# Patient Record
Sex: FEMALE | Race: WHITE | NOT HISPANIC OR LATINO | URBAN - METROPOLITAN AREA
[De-identification: names, ages, dates, MRNs, and addresses within clinical notes are randomized per-mention and may not be internally consistent; named-entity substitution may affect disease eponyms.]

---

## 2024-03-11 ENCOUNTER — APPOINTMENT (OUTPATIENT)
Dept: PRIMARY CARE | Facility: CLINIC | Age: 49
End: 2024-03-11
Payer: COMMERCIAL

## 2024-03-25 ENCOUNTER — OFFICE VISIT (OUTPATIENT)
Dept: PRIMARY CARE | Facility: CLINIC | Age: 49
End: 2024-03-25
Payer: COMMERCIAL

## 2024-03-25 ENCOUNTER — LAB (OUTPATIENT)
Dept: LAB | Facility: LAB | Age: 49
End: 2024-03-25
Payer: COMMERCIAL

## 2024-03-25 VITALS
OXYGEN SATURATION: 98 % | DIASTOLIC BLOOD PRESSURE: 81 MMHG | WEIGHT: 182 LBS | HEIGHT: 67 IN | SYSTOLIC BLOOD PRESSURE: 129 MMHG | HEART RATE: 62 BPM | BODY MASS INDEX: 28.56 KG/M2

## 2024-03-25 DIAGNOSIS — Z87.440 HISTORY OF UTI: ICD-10-CM

## 2024-03-25 DIAGNOSIS — E55.9 VITAMIN D DEFICIENCY: ICD-10-CM

## 2024-03-25 DIAGNOSIS — Z00.00 WELLNESS EXAMINATION: ICD-10-CM

## 2024-03-25 DIAGNOSIS — Z13.220 LIPID SCREENING: ICD-10-CM

## 2024-03-25 DIAGNOSIS — Z12.31 ENCOUNTER FOR SCREENING MAMMOGRAM FOR MALIGNANT NEOPLASM OF BREAST: ICD-10-CM

## 2024-03-25 DIAGNOSIS — Z12.11 COLON CANCER SCREENING: ICD-10-CM

## 2024-03-25 LAB
ALBUMIN SERPL BCP-MCNC: 3.8 G/DL (ref 3.4–5)
ALP SERPL-CCNC: 45 U/L (ref 33–110)
ALT SERPL W P-5'-P-CCNC: 7 U/L (ref 7–45)
ANION GAP SERPL CALC-SCNC: 10 MMOL/L (ref 10–20)
AST SERPL W P-5'-P-CCNC: 11 U/L (ref 9–39)
BASOPHILS # BLD AUTO: 0.04 X10*3/UL (ref 0–0.1)
BASOPHILS NFR BLD AUTO: 0.6 %
BILIRUB SERPL-MCNC: 0.7 MG/DL (ref 0–1.2)
BUN SERPL-MCNC: 10 MG/DL (ref 6–23)
CALCIUM SERPL-MCNC: 8.7 MG/DL (ref 8.6–10.3)
CHLORIDE SERPL-SCNC: 109 MMOL/L (ref 98–107)
CHOLEST SERPL-MCNC: 187 MG/DL (ref 0–199)
CHOLESTEROL/HDL RATIO: 3.3
CO2 SERPL-SCNC: 26 MMOL/L (ref 21–32)
CREAT SERPL-MCNC: 0.7 MG/DL (ref 0.5–1.05)
EGFRCR SERPLBLD CKD-EPI 2021: >90 ML/MIN/1.73M*2
EOSINOPHIL # BLD AUTO: 0.15 X10*3/UL (ref 0–0.7)
EOSINOPHIL NFR BLD AUTO: 2.3 %
ERYTHROCYTE [DISTWIDTH] IN BLOOD BY AUTOMATED COUNT: 14.6 % (ref 11.5–14.5)
GLUCOSE SERPL-MCNC: 91 MG/DL (ref 74–99)
HCT VFR BLD AUTO: 41.5 % (ref 36–46)
HDLC SERPL-MCNC: 56.2 MG/DL
HGB BLD-MCNC: 12.5 G/DL (ref 12–16)
IMM GRANULOCYTES # BLD AUTO: 0.01 X10*3/UL (ref 0–0.7)
IMM GRANULOCYTES NFR BLD AUTO: 0.2 % (ref 0–0.9)
LDLC SERPL CALC-MCNC: 115 MG/DL
LYMPHOCYTES # BLD AUTO: 2.83 X10*3/UL (ref 1.2–4.8)
LYMPHOCYTES NFR BLD AUTO: 42.6 %
MCH RBC QN AUTO: 28.2 PG (ref 26–34)
MCHC RBC AUTO-ENTMCNC: 30.1 G/DL (ref 32–36)
MCV RBC AUTO: 94 FL (ref 80–100)
MONOCYTES # BLD AUTO: 0.51 X10*3/UL (ref 0.1–1)
MONOCYTES NFR BLD AUTO: 7.7 %
NEUTROPHILS # BLD AUTO: 3.1 X10*3/UL (ref 1.2–7.7)
NEUTROPHILS NFR BLD AUTO: 46.6 %
NON HDL CHOLESTEROL: 131 MG/DL (ref 0–149)
NRBC BLD-RTO: 0 /100 WBCS (ref 0–0)
PLATELET # BLD AUTO: 298 X10*3/UL (ref 150–450)
POC APPEARANCE, URINE: CLEAR
POC BILIRUBIN, URINE: NEGATIVE
POC BLOOD, URINE: ABNORMAL
POC COLOR, URINE: YELLOW
POC GLUCOSE, URINE: NEGATIVE MG/DL
POC KETONES, URINE: NEGATIVE MG/DL
POC LEUKOCYTES, URINE: NEGATIVE
POC NITRITE,URINE: NEGATIVE
POC PH, URINE: 7 PH
POC PROTEIN, URINE: ABNORMAL MG/DL
POC SPECIFIC GRAVITY, URINE: 1.02
POC UROBILINOGEN, URINE: 0.2 EU/DL
POTASSIUM SERPL-SCNC: 4.1 MMOL/L (ref 3.5–5.3)
PROT SERPL-MCNC: 6.3 G/DL (ref 6.4–8.2)
RBC # BLD AUTO: 4.43 X10*6/UL (ref 4–5.2)
SODIUM SERPL-SCNC: 141 MMOL/L (ref 136–145)
TRIGL SERPL-MCNC: 78 MG/DL (ref 0–149)
VLDL: 16 MG/DL (ref 0–40)
WBC # BLD AUTO: 6.6 X10*3/UL (ref 4.4–11.3)

## 2024-03-25 PROCEDURE — 81003 URINALYSIS AUTO W/O SCOPE: CPT | Performed by: NURSE PRACTITIONER

## 2024-03-25 PROCEDURE — 1036F TOBACCO NON-USER: CPT | Performed by: NURSE PRACTITIONER

## 2024-03-25 PROCEDURE — 80061 LIPID PANEL: CPT

## 2024-03-25 PROCEDURE — 82306 VITAMIN D 25 HYDROXY: CPT

## 2024-03-25 PROCEDURE — 85025 COMPLETE CBC W/AUTO DIFF WBC: CPT

## 2024-03-25 PROCEDURE — 99386 PREV VISIT NEW AGE 40-64: CPT | Performed by: NURSE PRACTITIONER

## 2024-03-25 PROCEDURE — 80053 COMPREHEN METABOLIC PANEL: CPT

## 2024-03-25 PROCEDURE — 36415 COLL VENOUS BLD VENIPUNCTURE: CPT

## 2024-03-25 RX ORDER — NORETHINDRONE ACETATE AND ETHINYL ESTRADIOL 1; 5 MG/1; UG/1
TABLET ORAL EVERY 24 HOURS
COMMUNITY
Start: 2021-11-29 | End: 2024-04-22 | Stop reason: SDUPTHER

## 2024-03-25 ASSESSMENT — ENCOUNTER SYMPTOMS
OCCASIONAL FEELINGS OF UNSTEADINESS: 1
DEPRESSION: 0
LOSS OF SENSATION IN FEET: 0

## 2024-03-25 NOTE — PROGRESS NOTES
"Subjective   Patient ID: Ange Mcdonald is a 48 y.o. female who presents for New Patient Visit (Patient is here today to establish care with a new pcp. Patient has complaints of left foot pain and the skin on her left legs is also painful. ).    48-year-old female here to establish care has not had a pcp. Has only seen urgent cares and GYN.  Has not had a general CPE.  No labs.   Fasting this am  3 UTI since Jan 2024.  Each time she went to the urgent care they gave her a different antibiotic.  She never had hematuria.  She developed a rash on her legs with pain and burning was treated with prednisone with resolution did not know what the rash was from states she thought it was from urinary tract infection.  Med HX: UTI; occasional hand numbness upon waking; chronic feet pain- stand all day at work- wear good shoes; chronic knee pain only when up and down-cannot squat without pain  Colon cancer screening-overdue  Breast cancer screening-mammogram 2/2023    Vision-has never had a vision check and denies any issues  Dentist- has not gone. Jaw surgery in teens.  Could not tell me the diagnosis but said that they broke her jaw and reset it and she had her jaw wired shut for 6 weeks.  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  But she is on her feet all day long does not sit she does kneel a lot    Takes Vitamin D supplement daily with Biotin, B complex, osteo-biflex  Only RX- hormones from GYN.          Review of Systems    Objective   /81   Pulse 62   Ht 1.702 m (5' 7\")   Wt 82.6 kg (182 lb)   SpO2 98%   BMI 28.51 kg/m²     Physical Exam  Vitals and nursing note reviewed.   Constitutional:       General: She is not in acute distress.     Appearance: Normal appearance. She is normal weight.   HENT:      Head: Normocephalic and atraumatic.      Right Ear: External ear normal.      Left Ear: External ear normal.      Nose: Nose normal.      Mouth/Throat: "      Mouth: Mucous membranes are moist.      Pharynx: Oropharynx is clear.   Eyes:      Extraocular Movements: Extraocular movements intact.      Conjunctiva/sclera: Conjunctivae normal.      Pupils: Pupils are equal, round, and reactive to light.   Neck:      Vascular: No carotid bruit.   Cardiovascular:      Rate and Rhythm: Normal rate and regular rhythm.      Pulses: Normal pulses.      Heart sounds: Normal heart sounds.   Pulmonary:      Effort: Pulmonary effort is normal.      Breath sounds: Normal breath sounds.   Musculoskeletal:      Cervical back: Normal range of motion and neck supple.   Lymphadenopathy:      Cervical: No cervical adenopathy.   Skin:     General: Skin is warm and dry.      Capillary Refill: Capillary refill takes less than 2 seconds.   Neurological:      Mental Status: She is alert and oriented to person, place, and time.   Psychiatric:         Mood and Affect: Mood normal.         Behavior: Behavior normal.         Thought Content: Thought content normal.         Judgment: Judgment normal.         Assessment/Plan   Diagnoses and all orders for this visit:  Encounter for screening mammogram for malignant neoplasm of breast  Comments:  She wants her GYN to order the mammogram  Colon cancer screening  Comments:  Agreeable for the Cologuard  Orders:  -     Cologuard® colon cancer screening; Future  Lipid screening  Comments:  Update fasting lipid panel  Orders:  -     Lipid Panel; Future  History of UTI  Comments:  Update in office urinalysis while asymptomatic so we have a baseline  Orders:  -     POCT UA Automated manually resulted  Vitamin D deficiency  Comments:  On vitamin D supplement.  Add vitamin D level goal 50-80  Orders:  -     Vitamin D 25-Hydroxy,Total (for eval of Vitamin D levels); Future  Wellness examination  Comments:  Update CBC CMP.  Screening discussed with patient.  Continue routine.  GYN visits.  Follow-up q. year and as needed  Orders:  -     CBC and Auto  Differential; Future  -     Comprehensive Metabolic Panel; Future  -     Lipid Panel; Future  Other orders  -     Follow Up In Primary Care - Health Maintenance; Future

## 2024-03-26 LAB — 25(OH)D3 SERPL-MCNC: 80 NG/ML (ref 30–100)

## 2024-04-14 LAB — NONINV COLON CA DNA+OCC BLD SCRN STL QL: POSITIVE

## 2024-04-22 ENCOUNTER — OFFICE VISIT (OUTPATIENT)
Dept: OBSTETRICS AND GYNECOLOGY | Facility: CLINIC | Age: 49
End: 2024-04-22
Payer: COMMERCIAL

## 2024-04-22 VITALS
HEIGHT: 68 IN | DIASTOLIC BLOOD PRESSURE: 80 MMHG | SYSTOLIC BLOOD PRESSURE: 101 MMHG | BODY MASS INDEX: 27.9 KG/M2 | WEIGHT: 184.1 LBS

## 2024-04-22 DIAGNOSIS — Z12.31 SCREENING MAMMOGRAM FOR BREAST CANCER: ICD-10-CM

## 2024-04-22 DIAGNOSIS — N95.1 SYMPTOMATIC MENOPAUSAL OR FEMALE CLIMACTERIC STATES: ICD-10-CM

## 2024-04-22 DIAGNOSIS — Z01.419 WELL WOMAN EXAM WITH ROUTINE GYNECOLOGICAL EXAM: Primary | ICD-10-CM

## 2024-04-22 PROCEDURE — 99396 PREV VISIT EST AGE 40-64: CPT | Performed by: OBSTETRICS & GYNECOLOGY

## 2024-04-22 PROCEDURE — 99386 PREV VISIT NEW AGE 40-64: CPT | Performed by: OBSTETRICS & GYNECOLOGY

## 2024-04-22 PROCEDURE — 1036F TOBACCO NON-USER: CPT | Performed by: OBSTETRICS & GYNECOLOGY

## 2024-04-22 RX ORDER — NORETHINDRONE ACETATE AND ETHINYL ESTRADIOL 1; 5 MG/1; UG/1
1 TABLET ORAL EVERY 24 HOURS
Qty: 90 TABLET | Refills: 3 | Status: SHIPPED | OUTPATIENT
Start: 2024-04-22 | End: 2025-04-22

## 2024-04-22 ASSESSMENT — PATIENT HEALTH QUESTIONNAIRE - PHQ9
2. FEELING DOWN, DEPRESSED OR HOPELESS: NOT AT ALL
SUM OF ALL RESPONSES TO PHQ9 QUESTIONS 1 AND 2: 0
1. LITTLE INTEREST OR PLEASURE IN DOING THINGS: NOT AT ALL

## 2024-04-22 ASSESSMENT — ENCOUNTER SYMPTOMS
DEPRESSION: 0
OCCASIONAL FEELINGS OF UNSTEADINESS: 0
LOSS OF SENSATION IN FEET: 0

## 2024-04-22 ASSESSMENT — PAIN SCALES - GENERAL: PAINLEVEL: 0-NO PAIN

## 2024-04-22 NOTE — PROGRESS NOTES
Stormy Mcdonald is a 49 y.o.  female who presents for annual exam. The patient has no complaints today. The patient is sexually active. GYN screening history: last pap: was normal. The patient is taking hormone replacement therapy. Patient denies post-menopausal vaginal bleeding.. The patient wears seatbelts: yes. The patient participates in regular exercise: no. Has the patient ever been transfused or tattooed?: not asked. The patient reports that there is not domestic violence in their life.  Pt would like to contnue with fem hrt.     Menstrual History:  OB History          0    Para   0    Term   0       0    AB   0    Living   0         SAB   0    IAB   0    Ectopic   0    Multiple   0    Live Births   0                  No LMP recorded. Patient is premenopausal.         History reviewed. No pertinent past medical history.    History reviewed. No pertinent surgical history.     No Known Allergies      Family History   Problem Relation Name Age of Onset    Diabetes type II Father      Ovarian cancer Father's Sister  50 - 59    Breast cancer Paternal Grandmother  40 - 49        Social History     Socioeconomic History    Marital status: Single     Spouse name: Not on file    Number of children: Not on file    Years of education: Not on file    Highest education level: Not on file   Occupational History    Not on file   Tobacco Use    Smoking status: Never    Smokeless tobacco: Never   Vaping Use    Vaping status: Never Used   Substance and Sexual Activity    Alcohol use: Yes     Comment: Socially    Drug use: Never    Sexual activity: Yes     Partners: Male   Other Topics Concern    Not on file   Social History Narrative    Not on file     Social Determinants of Health     Financial Resource Strain: Not on file   Food Insecurity: Not on file   Transportation Needs: Not on file   Physical Activity: Not on file   Stress: Not on file   Social Connections: Not on file   Intimate  "Partner Violence: Not on file   Housing Stability: Not on file            Objective   /80   Ht 1.735 m (5' 8.31\") Comment: WITH SHOES  Wt 83.5 kg (184 lb 1.6 oz) Comment: WITH SHOES  BMI 27.74 kg/m²     Physical Exam  Vitals and nursing note reviewed.   Constitutional:       General: She is not in acute distress.     Appearance: Normal appearance. She is not ill-appearing.   HENT:      Head: Normocephalic and atraumatic.      Mouth/Throat:      Mouth: Mucous membranes are moist.      Pharynx: Oropharynx is clear.   Eyes:      Extraocular Movements: Extraocular movements intact.      Conjunctiva/sclera: Conjunctivae normal.      Pupils: Pupils are equal, round, and reactive to light.   Neck:      Thyroid: No thyroid mass, thyromegaly or thyroid tenderness.   Cardiovascular:      Rate and Rhythm: Normal rate and regular rhythm.      Pulses: Normal pulses.      Heart sounds: Normal heart sounds. No murmur heard.  Pulmonary:      Effort: Pulmonary effort is normal.      Breath sounds: No wheezing or rhonchi.   Chest:   Breasts:     Right: Normal. No swelling, bleeding, inverted nipple, mass, nipple discharge, skin change or tenderness.      Left: Normal. No swelling, bleeding, inverted nipple, mass, nipple discharge, skin change or tenderness.   Abdominal:      General: Bowel sounds are normal. There is no distension.      Palpations: There is no mass.      Tenderness: There is no abdominal tenderness. There is no guarding or rebound.      Hernia: No hernia is present. There is no hernia in the left inguinal area or right inguinal area.   Genitourinary:     General: Normal vulva.      Pubic Area: No rash.       Labia:         Right: No rash, tenderness, lesion or injury.         Left: No rash, tenderness, lesion or injury.       Urethra: No prolapse or urethral swelling.      Vagina: No signs of injury. No vaginal discharge, tenderness or prolapsed vaginal walls.      Cervix: No cervical motion tenderness, " discharge, friability, lesion, erythema or cervical bleeding.      Uterus: Not deviated, not enlarged, not fixed, not tender and no uterine prolapse.       Adnexa:         Right: No mass, tenderness or fullness.          Left: No mass, tenderness or fullness.     Musculoskeletal:         General: No swelling, tenderness, deformity or signs of injury. Normal range of motion.      Cervical back: No rigidity.   Lymphadenopathy:      Cervical: No cervical adenopathy.      Upper Body:      Right upper body: No axillary adenopathy.      Left upper body: No axillary adenopathy.      Lower Body: No right inguinal adenopathy. No left inguinal adenopathy.   Skin:     General: Skin is warm.      Findings: No lesion or rash.   Neurological:      General: No focal deficit present.      Mental Status: She is alert and oriented to person, place, and time.   Psychiatric:         Mood and Affect: Mood normal.         Behavior: Behavior normal.         Thought Content: Thought content normal.         Judgment: Judgment normal.            Assessment/Plan   Problem List Items Addressed This Visit    None  Visit Diagnoses       Well woman exam with routine gynecological exam    -  Primary    Symptomatic menopausal or female climacteric states        Relevant Medications    norethindrone ac-eth estradioL (Femhrt 1/5) 1-5 mg-mcg tablet    Screening mammogram for breast cancer        Relevant Orders    BI mammo bilateral screening tomosynthesis           Follow up in about 1 year (around 4/22/2025) for well woman exam.      All questions answered.  Breast self exam technique reviewed and patient encouraged to perform self-exam monthly.  Diagnosis explained in detail, including differential.  Discussed healthy lifestyle modifications.

## 2024-04-23 DIAGNOSIS — R19.5 POSITIVE COLORECTAL CANCER SCREENING USING COLOGUARD TEST: Primary | ICD-10-CM

## 2024-04-29 DIAGNOSIS — Z12.11 SPECIAL SCREENING FOR MALIGNANT NEOPLASMS, COLON: ICD-10-CM

## 2024-04-29 RX ORDER — POLYETHYLENE GLYCOL 3350, SODIUM SULFATE ANHYDROUS, SODIUM BICARBONATE, SODIUM CHLORIDE, POTASSIUM CHLORIDE 236; 22.74; 6.74; 5.86; 2.97 G/4L; G/4L; G/4L; G/4L; G/4L
POWDER, FOR SOLUTION ORAL
Qty: 4000 ML | Refills: 0 | Status: SHIPPED | OUTPATIENT
Start: 2024-04-29

## 2024-06-17 ENCOUNTER — HOSPITAL ENCOUNTER (OUTPATIENT)
Dept: RADIOLOGY | Facility: HOSPITAL | Age: 49
Discharge: HOME | End: 2024-06-17
Payer: COMMERCIAL

## 2024-06-17 VITALS — HEIGHT: 68 IN | WEIGHT: 182 LBS | BODY MASS INDEX: 27.58 KG/M2

## 2024-06-17 DIAGNOSIS — Z12.31 SCREENING MAMMOGRAM FOR BREAST CANCER: ICD-10-CM

## 2024-06-17 PROCEDURE — 77067 SCR MAMMO BI INCL CAD: CPT

## 2024-06-17 PROCEDURE — 77067 SCR MAMMO BI INCL CAD: CPT | Performed by: RADIOLOGY

## 2024-06-17 PROCEDURE — 77063 BREAST TOMOSYNTHESIS BI: CPT | Performed by: RADIOLOGY

## 2024-07-15 ENCOUNTER — APPOINTMENT (OUTPATIENT)
Dept: GASTROENTEROLOGY | Facility: EXTERNAL LOCATION | Age: 49
End: 2024-07-15
Payer: COMMERCIAL

## 2024-07-15 DIAGNOSIS — D12.8 BENIGN NEOPLASM OF RECTUM AND ANAL CANAL: ICD-10-CM

## 2024-07-15 DIAGNOSIS — R19.5 POSITIVE COLORECTAL CANCER SCREENING USING COLOGUARD TEST: Primary | ICD-10-CM

## 2024-07-15 DIAGNOSIS — D12.9 BENIGN NEOPLASM OF RECTUM AND ANAL CANAL: ICD-10-CM

## 2024-07-15 DIAGNOSIS — D12.3 BENIGN NEOPLASM OF TRANSVERSE COLON: ICD-10-CM

## 2024-07-15 DIAGNOSIS — R19.5 POSITIVE COLORECTAL CANCER SCREENING USING COLOGUARD TEST: ICD-10-CM

## 2024-07-15 DIAGNOSIS — D12.2 BENIGN NEOPLASM OF ASCENDING COLON: ICD-10-CM

## 2024-07-15 PROCEDURE — 88305 TISSUE EXAM BY PATHOLOGIST: CPT | Performed by: PATHOLOGY

## 2024-07-15 PROCEDURE — 88305 TISSUE EXAM BY PATHOLOGIST: CPT

## 2024-07-15 PROCEDURE — 45385 COLONOSCOPY W/LESION REMOVAL: CPT | Performed by: INTERNAL MEDICINE

## 2024-07-15 NOTE — PROGRESS NOTES
Colonoscopy performed today 7/15/2024 at [place] [facility] [place] [place]  See procedure report(s) under Media tab.

## 2024-07-16 ENCOUNTER — LAB REQUISITION (OUTPATIENT)
Dept: LAB | Facility: HOSPITAL | Age: 49
End: 2024-07-16
Payer: COMMERCIAL

## 2024-07-24 LAB
LABORATORY COMMENT REPORT: NORMAL
PATH REPORT.FINAL DX SPEC: NORMAL
PATH REPORT.GROSS SPEC: NORMAL
PATH REPORT.RELEVANT HX SPEC: NORMAL
PATH REPORT.TOTAL CANCER: NORMAL

## 2024-09-07 ENCOUNTER — LAB REQUISITION (OUTPATIENT)
Dept: LAB | Facility: HOSPITAL | Age: 49
End: 2024-09-07
Payer: COMMERCIAL

## 2024-09-07 DIAGNOSIS — N39.0 URINARY TRACT INFECTION, SITE NOT SPECIFIED: ICD-10-CM

## 2024-09-07 PROCEDURE — 87086 URINE CULTURE/COLONY COUNT: CPT

## 2024-09-07 PROCEDURE — 87186 SC STD MICRODIL/AGAR DIL: CPT

## 2024-09-10 LAB — BACTERIA UR CULT: ABNORMAL

## 2024-11-25 ENCOUNTER — LAB (OUTPATIENT)
Dept: LAB | Facility: LAB | Age: 49
End: 2024-11-25
Payer: COMMERCIAL

## 2024-11-25 ENCOUNTER — APPOINTMENT (OUTPATIENT)
Dept: PRIMARY CARE | Facility: CLINIC | Age: 49
End: 2024-11-25
Payer: COMMERCIAL

## 2024-11-25 VITALS
BODY MASS INDEX: 28.64 KG/M2 | SYSTOLIC BLOOD PRESSURE: 127 MMHG | HEART RATE: 67 BPM | WEIGHT: 189 LBS | OXYGEN SATURATION: 99 % | DIASTOLIC BLOOD PRESSURE: 79 MMHG | HEIGHT: 68 IN

## 2024-11-25 DIAGNOSIS — R10.9 FLANK PAIN: Primary | ICD-10-CM

## 2024-11-25 DIAGNOSIS — R11.11 VOMITING WITHOUT NAUSEA, UNSPECIFIED VOMITING TYPE: ICD-10-CM

## 2024-11-25 DIAGNOSIS — R10.9 FLANK PAIN: ICD-10-CM

## 2024-11-25 LAB
ALBUMIN SERPL BCP-MCNC: 4.1 G/DL (ref 3.4–5)
ALP SERPL-CCNC: 57 U/L (ref 33–110)
ALT SERPL W P-5'-P-CCNC: 7 U/L (ref 7–45)
AMYLASE SERPL-CCNC: 107 U/L (ref 29–103)
ANION GAP SERPL CALC-SCNC: 13 MMOL/L (ref 10–20)
APPEARANCE UR: CLEAR
AST SERPL W P-5'-P-CCNC: 11 U/L (ref 9–39)
BASOPHILS # BLD AUTO: 0.04 X10*3/UL (ref 0–0.1)
BASOPHILS NFR BLD AUTO: 0.5 %
BILIRUB SERPL-MCNC: 0.4 MG/DL (ref 0–1.2)
BILIRUB UR STRIP.AUTO-MCNC: NEGATIVE MG/DL
BUN SERPL-MCNC: 14 MG/DL (ref 6–23)
CALCIUM SERPL-MCNC: 9 MG/DL (ref 8.6–10.3)
CAOX CRY #/AREA UR COMP ASSIST: ABNORMAL /HPF
CHLORIDE SERPL-SCNC: 104 MMOL/L (ref 98–107)
CO2 SERPL-SCNC: 26 MMOL/L (ref 21–32)
COLOR UR: ABNORMAL
CREAT SERPL-MCNC: 0.73 MG/DL (ref 0.5–1.05)
EGFRCR SERPLBLD CKD-EPI 2021: >90 ML/MIN/1.73M*2
EOSINOPHIL # BLD AUTO: 0.07 X10*3/UL (ref 0–0.7)
EOSINOPHIL NFR BLD AUTO: 0.8 %
ERYTHROCYTE [DISTWIDTH] IN BLOOD BY AUTOMATED COUNT: 13.1 % (ref 11.5–14.5)
GLUCOSE SERPL-MCNC: 91 MG/DL (ref 74–99)
GLUCOSE UR STRIP.AUTO-MCNC: NORMAL MG/DL
HCT VFR BLD AUTO: 41.2 % (ref 36–46)
HGB BLD-MCNC: 13.2 G/DL (ref 12–16)
IMM GRANULOCYTES # BLD AUTO: 0.03 X10*3/UL (ref 0–0.7)
IMM GRANULOCYTES NFR BLD AUTO: 0.3 % (ref 0–0.9)
IRON SATN MFR SERPL: 34 % (ref 25–45)
IRON SERPL-MCNC: 102 UG/DL (ref 35–150)
KETONES UR STRIP.AUTO-MCNC: NEGATIVE MG/DL
LEUKOCYTE ESTERASE UR QL STRIP.AUTO: NEGATIVE
LIPASE SERPL-CCNC: 39 U/L (ref 9–82)
LYMPHOCYTES # BLD AUTO: 2.94 X10*3/UL (ref 1.2–4.8)
LYMPHOCYTES NFR BLD AUTO: 33.8 %
MAGNESIUM SERPL-MCNC: 2.12 MG/DL (ref 1.6–2.4)
MCH RBC QN AUTO: 28.9 PG (ref 26–34)
MCHC RBC AUTO-ENTMCNC: 32 G/DL (ref 32–36)
MCV RBC AUTO: 90 FL (ref 80–100)
MONOCYTES # BLD AUTO: 0.6 X10*3/UL (ref 0.1–1)
MONOCYTES NFR BLD AUTO: 6.9 %
MUCOUS THREADS #/AREA URNS AUTO: ABNORMAL /LPF
NEUTROPHILS # BLD AUTO: 5.01 X10*3/UL (ref 1.2–7.7)
NEUTROPHILS NFR BLD AUTO: 57.7 %
NITRITE UR QL STRIP.AUTO: NEGATIVE
NRBC BLD-RTO: 0 /100 WBCS (ref 0–0)
PH UR STRIP.AUTO: 5.5 [PH]
PLATELET # BLD AUTO: 304 X10*3/UL (ref 150–450)
POTASSIUM SERPL-SCNC: 4.5 MMOL/L (ref 3.5–5.3)
PROT SERPL-MCNC: 6.6 G/DL (ref 6.4–8.2)
PROT UR STRIP.AUTO-MCNC: ABNORMAL MG/DL
RBC # BLD AUTO: 4.56 X10*6/UL (ref 4–5.2)
RBC # UR STRIP.AUTO: ABNORMAL /UL
RBC #/AREA URNS AUTO: ABNORMAL /HPF
SODIUM SERPL-SCNC: 138 MMOL/L (ref 136–145)
SP GR UR STRIP.AUTO: 1.02
SQUAMOUS #/AREA URNS AUTO: ABNORMAL /HPF
TIBC SERPL-MCNC: 303 UG/DL (ref 240–445)
UIBC SERPL-MCNC: 201 UG/DL (ref 110–370)
UROBILINOGEN UR STRIP.AUTO-MCNC: NORMAL MG/DL
WBC # BLD AUTO: 8.7 X10*3/UL (ref 4.4–11.3)
WBC #/AREA URNS AUTO: ABNORMAL /HPF

## 2024-11-25 PROCEDURE — 85025 COMPLETE CBC W/AUTO DIFF WBC: CPT

## 2024-11-25 PROCEDURE — 80053 COMPREHEN METABOLIC PANEL: CPT

## 2024-11-25 PROCEDURE — 83550 IRON BINDING TEST: CPT

## 2024-11-25 PROCEDURE — 83690 ASSAY OF LIPASE: CPT

## 2024-11-25 PROCEDURE — 99214 OFFICE O/P EST MOD 30 MIN: CPT | Performed by: NURSE PRACTITIONER

## 2024-11-25 PROCEDURE — 83735 ASSAY OF MAGNESIUM: CPT

## 2024-11-25 PROCEDURE — 81001 URINALYSIS AUTO W/SCOPE: CPT

## 2024-11-25 PROCEDURE — 3008F BODY MASS INDEX DOCD: CPT | Performed by: NURSE PRACTITIONER

## 2024-11-25 PROCEDURE — 36415 COLL VENOUS BLD VENIPUNCTURE: CPT

## 2024-11-25 PROCEDURE — 83540 ASSAY OF IRON: CPT

## 2024-11-25 PROCEDURE — 82150 ASSAY OF AMYLASE: CPT

## 2024-11-25 ASSESSMENT — ENCOUNTER SYMPTOMS
FEVER: 0
ABDOMINAL PAIN: 1
FREQUENCY: 0
MYALGIAS: 0
ANOREXIA: 0
NAUSEA: 0
BELCHING: 0
VOMITING: 1
HEMATURIA: 0
HEADACHES: 0
PAIN: 1
DIARRHEA: 0
WEIGHT LOSS: 0
CONSTIPATION: 0
FLATUS: 0
HEMATOCHEZIA: 0
DYSURIA: 0
ARTHRALGIAS: 1

## 2024-11-25 NOTE — PROGRESS NOTES
"Subjective   Patient ID: Ange Mcdonald is a 49 y.o. female who presents for Pain (Patient mentioned since 07/2024 she started to have pain in her left side that 3-4 times a week she gets severe pain and vomits due to pain. ).    49 year old female here for Had a colonoscopy in July started to get pain in her LT lateral flank area- the side of her abdomen. Gets sudden sharp stabbing pain. Continues to work. Throws up then the pain goes away. Occurs approx every 4 days. Not accompanied by nausea. No HA. No diarrhea/constipation. Stools are light brown.   Meds reviewed.   Ashwaganda, biotin, vitamin D, B complex  Gaining weight  and does not know why. \"I can't fit into my clothes.     Pain  Associated symptoms include abdominal pain and vomiting. Pertinent negatives include no constipation, diarrhea, dysuria, fever, headaches or nausea.   Abdominal Pain  This is a new problem. The current episode started more than 1 month ago. The onset quality is gradual. The problem occurs every several days. The most recent episode lasted 1 hours. The problem has been rapidly worsening. The pain is located in the LLQ and left flank. The pain is at a severity of 7/10. The quality of the pain is sharp. The abdominal pain radiates to the back and left flank. Associated symptoms include arthralgias and vomiting. Pertinent negatives include no anorexia, belching, constipation, diarrhea, dysuria, fever, flatus, frequency, headaches, hematochezia, hematuria, melena, myalgias, nausea or weight loss. The pain is aggravated by deep breathing and movement. Prior diagnostic workup includes lower endoscopy.        Review of Systems   Constitutional:  Negative for fever and weight loss.   Gastrointestinal:  Positive for abdominal pain and vomiting. Negative for anorexia, constipation, diarrhea, flatus, hematochezia, melena and nausea.   Genitourinary:  Negative for dysuria, frequency and hematuria.   Musculoskeletal:  Positive for arthralgias. " "Negative for myalgias.   Neurological:  Negative for headaches.       Objective   /79   Pulse 67   Ht 1.727 m (5' 8\")   Wt 85.7 kg (189 lb)   SpO2 99%   BMI 28.74 kg/m²     Physical Exam  Constitutional:       Appearance: She is obese.   Cardiovascular:      Rate and Rhythm: Normal rate and regular rhythm.      Pulses: Normal pulses.      Heart sounds: Normal heart sounds.   Pulmonary:      Effort: Pulmonary effort is normal.      Breath sounds: Normal breath sounds.   Abdominal:      Palpations: Abdomen is soft.      Tenderness: There is abdominal tenderness (LT flank and anterior LUQ).   Neurological:      General: No focal deficit present.      Mental Status: She is alert and oriented to person, place, and time. Mental status is at baseline.   Psychiatric:         Mood and Affect: Mood normal.         Behavior: Behavior normal.         Assessment/Plan   Diagnoses and all orders for this visit:  Flank pain  -     CT abdomen pelvis wo IV contrast; Future  -     Magnesium; Future  -     Iron and TIBC; Future  -     CBC and Auto Differential; Future  -     Comprehensive Metabolic Panel; Future  -     Urinalysis with Reflex Culture and Microscopic; Future  -     Amylase; Future  -     Lipase; Future  Vomiting without nausea, unspecified vomiting type  -     Magnesium; Future  -     Iron and TIBC; Future  -     CBC and Auto Differential; Future  -     Comprehensive Metabolic Panel; Future  -     Urinalysis with Reflex Culture and Microscopic; Future  -     Amylase; Future  -     Lipase; Future      Nonspecific dizziness reproducible with palpation cannot quite tell if it is the rib cage.        "

## 2024-11-26 LAB — HOLD SPECIMEN: NORMAL

## 2024-12-09 ENCOUNTER — HOSPITAL ENCOUNTER (OUTPATIENT)
Dept: RADIOLOGY | Facility: HOSPITAL | Age: 49
Discharge: HOME | End: 2024-12-09
Payer: COMMERCIAL

## 2024-12-09 DIAGNOSIS — R10.9 FLANK PAIN: ICD-10-CM

## 2024-12-09 PROCEDURE — 74176 CT ABD & PELVIS W/O CONTRAST: CPT | Performed by: RADIOLOGY

## 2024-12-09 PROCEDURE — 74176 CT ABD & PELVIS W/O CONTRAST: CPT

## 2024-12-11 DIAGNOSIS — N20.0 RENAL CALCULI: Primary | ICD-10-CM

## 2024-12-12 DIAGNOSIS — N13.8 URINARY TRACT OBSTRUCTION BY KIDNEY STONE: Primary | ICD-10-CM

## 2024-12-12 DIAGNOSIS — N20.0 RENAL CALCULI: ICD-10-CM

## 2024-12-12 DIAGNOSIS — N20.0 URINARY TRACT OBSTRUCTION BY KIDNEY STONE: Primary | ICD-10-CM

## 2024-12-15 NOTE — PROGRESS NOTES
Subjective   Patient ID: Ange Mcdonald is a 49 y.o. female. Was kindly referred by Geraldine Dallas, APRN-CNP    HPI  49 y.o. female presents with severe pain in the LLQ 3-4 times per week. The pain causes emesis occasionally. She expresses the pain is not accompanied by nausea. CT Abdomen results read an obstructing left renal calculus measuring 2.3 cm. Along with a non obstructing left renal calculi measuring 0.5 cm within the inferior pole.     The patient has the chills when she experiences the pain. She denies fever.     This is the first time she has had a stone. She has no prior history of renal or ureteral stones.     She is a  and works 50-60 hours a week. She states that if she experiences the pain during work, she will work through the pain. She stated she has experienced one episode at work of pain and chills.     CT ABDOMEN PELVIS WO IV CONTRAST; 12/9/2024   IMPRESSION:  1.  Obstructing left renal calculus measuring up to 2.3 cm with mild  resultant pelvic fullness and additional nonobstructing left renal  calculi measuring up to 0.5 cm within the inferior pole. Urology  consultation is recommended for further management.  2. Colonic diverticulosis without evidence of acute diverticulitis.    Component      Latest Ref Rn 11/25/2024   AMYLASE      29 - 103 U/L 107 (H)       Component      Latest Ref Rn 11/25/2024   Color, Urine      Light-Yellow, Yellow, Dark-Yellow  Light-Yellow    Appearance, Urine      Clear  Clear    Specific Gravity, Urine      1.005 - 1.035  1.024    pH, Urine      5.0, 5.5, 6.0, 6.5, 7.0, 7.5, 8.0  5.5    Protein, Urine      NEGATIVE, 10 (TRACE), 20 (TRACE) mg/dL 10 (TRACE)    Glucose, Urine      Normal mg/dL Normal    Blood, Urine      NEGATIVE  0.03 (TRACE) !    Ketones, Urine      NEGATIVE mg/dL NEGATIVE    Bilirubin, Urine      NEGATIVE  NEGATIVE    Urobilinogen, Urine      Normal mg/dL Normal    Nitrite, Urine      NEGATIVE  NEGATIVE    Leukocyte Esterase, Urine       NEGATIVE  NEGATIVE       Component      Latest Ref Rng 11/25/2024   WBC, Urine      1-5, NONE /HPF 1-5    RBC, Urine      NONE, 1-2, 3-5 /HPF 6-10 !    Squamous Epithelial Cells, Urine      Reference range not established. /HPF 1-9 (SPARSE)    Mucus, Urine      Reference range not established. /LPF FEW    Calcium Oxalate Crystals, Urine      NONE, 1+ /HPF 3+ !       Review of Systems  A complete review of systems was performed. All systems are noted to be negative unless indicated in the history of present illness, impression, active problem list, or past histories.     Objective   Physical Exam  General: Well developed, well nourished, alert and cooperative, appears in no acute distress  Eyes: Non-injected conjunctiva, sclera clear, no proptosis  Cardiac: Extremities are warm and well perfused. No edema, cyanosis or pallor.   Lungs: Breathing is easy, non-labored. Speaking in clear and complete sentences. Normal diaphragmatic movement.  Abdomen: soft, non-tender, no CVA tenderness   MSK: Ambulatory with steady gait, unassisted  Neuro: alert and oriented to person, place and time  Psych: Demonstrates good judgement and reason, without hallucinations, abnormal affect or abnormal behaviors.  Skin: no obvious lesions, no rashes.      Assessment/Plan   Diagnoses and all orders for this visit:  Urinary tract obstruction by kidney stone  -     Referral to Urology  Renal calculi  -     Referral to Urology  -     Case Request Operating Room: Cystoscopy with Lithotripsy Laser; Standing  Other orders  -     NPO Diet Except: Sips with meds; Effective now; Standing  -     Height and weight; Standing  -     Insert and maintain peripheral IV; Standing  -     Saline lock IV; Standing  -     POCT pregnancy, urine; Standing  -     Place in outpatient/hospital ambulatory surgery; Standing  -     Full code; Standing  -     ceFAZolin (Ancef) 2 g in dextrose (iso)  mL      49 y.o. female presents with severe pain in the LLQ 3-4  times per week. The pain causes emesis occasionally. She expresses the pain is not accompanied by nausea.     I personally reviewed the medical records of the patient including the lab values, the note of the referring physician and I reviewed the report and visualized the images performed focusing on the CT abdomen completed on 12/09/2024. CT Abdomen results read an obstructing left renal calculus measuring 2.3 cm. Along with a non obstructing left renal calculi measuring 0.5 cm within the inferior pole.     I discussed with the patient the management options for kidney and ureteral stones, including observation, medical expulsive therapy, stent placement, rigid or flexible ureteroscopy with stone extraction or laser stone fragmentation, ESWL (extracorporeal shock wave lithotripsy), or more advanced options for bigger stones such as percutaneous or surgical approaches. The approach depends on the size and location of the stone as well as any associated infection.     For ureteral stones, medical expulsive therapy is attempted for smaller stones however intervention is indicated in severe pain not controlled by medical therapy, intractable nausea or vomiting, and fever or chills.     In this case, I recommended a left urethoscopy with laser lithotripsy based on the proximal location of the stone.     ESWL is noninvasive and requires no stent in the majority of cases, however the stone free rate is lower than ureteroscopy which is more invasive and requires a stent uniformity.     The patient agreed to proceed with ESWL after completing the course of antibiotics.     I have advised her that I do not think waiting until 02/2025.     I have advised her that she should lay on her left side. This will allow the stone to go back to the kidney to allow the kidney where to flow.     Plan:  Left urethoscopy with laser lithotripsy on 12/27/2024 in [place].       Scribe Attestation   By signing my name below, Jason MARTIN  Aden Jimenez attestation that this documentation has been prepared under the direction and in the presence of Tanya Martinez MD.

## 2024-12-15 NOTE — H&P (VIEW-ONLY)
Subjective   Patient ID: Ange Mcdonald is a 49 y.o. female. Was kindly referred by Geraldine Dallas, APRN-CNP    HPI  49 y.o. female presents with severe pain in the LLQ 3-4 times per week. The pain causes emesis occasionally. She expresses the pain is not accompanied by nausea. CT Abdomen results read an obstructing left renal calculus measuring 2.3 cm. Along with a non obstructing left renal calculi measuring 0.5 cm within the inferior pole.     The patient has the chills when she experiences the pain. She denies fever.     This is the first time she has had a stone. She has no prior history of renal or ureteral stones.     She is a  and works 50-60 hours a week. She states that if she experiences the pain during work, she will work through the pain. She stated she has experienced one episode at work of pain and chills.     CT ABDOMEN PELVIS WO IV CONTRAST; 12/9/2024   IMPRESSION:  1.  Obstructing left renal calculus measuring up to 2.3 cm with mild  resultant pelvic fullness and additional nonobstructing left renal  calculi measuring up to 0.5 cm within the inferior pole. Urology  consultation is recommended for further management.  2. Colonic diverticulosis without evidence of acute diverticulitis.    Component      Latest Ref Rn 11/25/2024   AMYLASE      29 - 103 U/L 107 (H)       Component      Latest Ref Rn 11/25/2024   Color, Urine      Light-Yellow, Yellow, Dark-Yellow  Light-Yellow    Appearance, Urine      Clear  Clear    Specific Gravity, Urine      1.005 - 1.035  1.024    pH, Urine      5.0, 5.5, 6.0, 6.5, 7.0, 7.5, 8.0  5.5    Protein, Urine      NEGATIVE, 10 (TRACE), 20 (TRACE) mg/dL 10 (TRACE)    Glucose, Urine      Normal mg/dL Normal    Blood, Urine      NEGATIVE  0.03 (TRACE) !    Ketones, Urine      NEGATIVE mg/dL NEGATIVE    Bilirubin, Urine      NEGATIVE  NEGATIVE    Urobilinogen, Urine      Normal mg/dL Normal    Nitrite, Urine      NEGATIVE  NEGATIVE    Leukocyte Esterase, Urine       NEGATIVE  NEGATIVE       Component      Latest Ref Rng 11/25/2024   WBC, Urine      1-5, NONE /HPF 1-5    RBC, Urine      NONE, 1-2, 3-5 /HPF 6-10 !    Squamous Epithelial Cells, Urine      Reference range not established. /HPF 1-9 (SPARSE)    Mucus, Urine      Reference range not established. /LPF FEW    Calcium Oxalate Crystals, Urine      NONE, 1+ /HPF 3+ !       Review of Systems  A complete review of systems was performed. All systems are noted to be negative unless indicated in the history of present illness, impression, active problem list, or past histories.     Objective   Physical Exam  General: Well developed, well nourished, alert and cooperative, appears in no acute distress  Eyes: Non-injected conjunctiva, sclera clear, no proptosis  Cardiac: Extremities are warm and well perfused. No edema, cyanosis or pallor.   Lungs: Breathing is easy, non-labored. Speaking in clear and complete sentences. Normal diaphragmatic movement.  Abdomen: soft, non-tender, no CVA tenderness   MSK: Ambulatory with steady gait, unassisted  Neuro: alert and oriented to person, place and time  Psych: Demonstrates good judgement and reason, without hallucinations, abnormal affect or abnormal behaviors.  Skin: no obvious lesions, no rashes.      Assessment/Plan   Diagnoses and all orders for this visit:  Urinary tract obstruction by kidney stone  -     Referral to Urology  Renal calculi  -     Referral to Urology  -     Case Request Operating Room: Cystoscopy with Lithotripsy Laser; Standing  Other orders  -     NPO Diet Except: Sips with meds; Effective now; Standing  -     Height and weight; Standing  -     Insert and maintain peripheral IV; Standing  -     Saline lock IV; Standing  -     POCT pregnancy, urine; Standing  -     Place in outpatient/hospital ambulatory surgery; Standing  -     Full code; Standing  -     ceFAZolin (Ancef) 2 g in dextrose (iso)  mL      49 y.o. female presents with severe pain in the LLQ 3-4  times per week. The pain causes emesis occasionally. She expresses the pain is not accompanied by nausea.     I personally reviewed the medical records of the patient including the lab values, the note of the referring physician and I reviewed the report and visualized the images performed focusing on the CT abdomen completed on 12/09/2024. CT Abdomen results read an obstructing left renal calculus measuring 2.3 cm. Along with a non obstructing left renal calculi measuring 0.5 cm within the inferior pole.     I discussed with the patient the management options for kidney and ureteral stones, including observation, medical expulsive therapy, stent placement, rigid or flexible ureteroscopy with stone extraction or laser stone fragmentation, ESWL (extracorporeal shock wave lithotripsy), or more advanced options for bigger stones such as percutaneous or surgical approaches. The approach depends on the size and location of the stone as well as any associated infection.     For ureteral stones, medical expulsive therapy is attempted for smaller stones however intervention is indicated in severe pain not controlled by medical therapy, intractable nausea or vomiting, and fever or chills.     In this case, I recommended a left urethoscopy with laser lithotripsy based on the proximal location of the stone.     ESWL is noninvasive and requires no stent in the majority of cases, however the stone free rate is lower than ureteroscopy which is more invasive and requires a stent uniformity.     The patient agreed to proceed with ESWL after completing the course of antibiotics.     I have advised her that I do not think waiting until 02/2025.     I have advised her that she should lay on her left side. This will allow the stone to go back to the kidney to allow the kidney where to flow.     Plan:  Left urethoscopy with laser lithotripsy on 12/27/2024 in [place].       Scribe Attestation   By signing my name below, Jason MARTIN  Aden Jimenez attestation that this documentation has been prepared under the direction and in the presence of Tanya Martinez MD.

## 2024-12-16 ENCOUNTER — APPOINTMENT (OUTPATIENT)
Dept: UROLOGY | Facility: CLINIC | Age: 49
End: 2024-12-16
Payer: COMMERCIAL

## 2024-12-16 DIAGNOSIS — N20.0 URINARY TRACT OBSTRUCTION BY KIDNEY STONE: Primary | ICD-10-CM

## 2024-12-16 DIAGNOSIS — N20.0 RENAL CALCULI: ICD-10-CM

## 2024-12-16 DIAGNOSIS — N13.8 URINARY TRACT OBSTRUCTION BY KIDNEY STONE: Primary | ICD-10-CM

## 2024-12-16 PROCEDURE — 99204 OFFICE O/P NEW MOD 45 MIN: CPT | Performed by: STUDENT IN AN ORGANIZED HEALTH CARE EDUCATION/TRAINING PROGRAM

## 2024-12-16 NOTE — LETTER
December 17, 2024     DICK Guerrero  [address]  [facility], [address]  [facility] [id number]    Patient: Ange Mcdonald   YOB: 1975   Date of Visit: 12/16/2024       Dear DICK Galarza:    Thank you for referring Ange Mcdonald to me for evaluation. Below are my notes for this consultation.  If you have questions, please do not hesitate to call me. I look forward to following your patient along with you.       Sincerely,     Tanya Martinez MD      CC: No Recipients  ______________________________________________________________________________________    Subjective  Patient ID: Ange Mcdonald is a 49 y.o. female. Was kindly referred by Geraldine Dallas APRN-CNP    HPI  49 y.o. female presents with severe pain in the LLQ 3-4 times per week. The pain causes emesis occasionally. She expresses the pain is not accompanied by nausea. CT Abdomen results read an obstructing left renal calculus measuring 2.3 cm. Along with a non obstructing left renal calculi measuring 0.5 cm within the inferior pole.     The patient has the chills when she experiences the pain. She denies fever.     This is the first time she has had a stone. She has no prior history of renal or ureteral stones.     She is a  and works 50-60 hours a week. She states that if she experiences the pain during work, she will work through the pain. She stated she has experienced one episode at work of pain and chills.     CT ABDOMEN PELVIS WO IV CONTRAST; 12/9/2024   IMPRESSION:  1.  Obstructing left renal calculus measuring up to 2.3 cm with mild  resultant pelvic fullness and additional nonobstructing left renal  calculi measuring up to 0.5 cm within the inferior pole. Urology  consultation is recommended for further management.  2. Colonic diverticulosis without evidence of acute diverticulitis.    Component      Latest Ref Rng 11/25/2024   AMYLASE      29 - 103 U/L 107 (H)       Component       Latest Ref Rng 11/25/2024   Color, Urine      Light-Yellow, Yellow, Dark-Yellow  Light-Yellow    Appearance, Urine      Clear  Clear    Specific Gravity, Urine      1.005 - 1.035  1.024    pH, Urine      5.0, 5.5, 6.0, 6.5, 7.0, 7.5, 8.0  5.5    Protein, Urine      NEGATIVE, 10 (TRACE), 20 (TRACE) mg/dL 10 (TRACE)    Glucose, Urine      Normal mg/dL Normal    Blood, Urine      NEGATIVE  0.03 (TRACE) !    Ketones, Urine      NEGATIVE mg/dL NEGATIVE    Bilirubin, Urine      NEGATIVE  NEGATIVE    Urobilinogen, Urine      Normal mg/dL Normal    Nitrite, Urine      NEGATIVE  NEGATIVE    Leukocyte Esterase, Urine      NEGATIVE  NEGATIVE       Component      Latest Ref Rng 11/25/2024   WBC, Urine      1-5, NONE /HPF 1-5    RBC, Urine      NONE, 1-2, 3-5 /HPF 6-10 !    Squamous Epithelial Cells, Urine      Reference range not established. /HPF 1-9 (SPARSE)    Mucus, Urine      Reference range not established. /LPF FEW    Calcium Oxalate Crystals, Urine      NONE, 1+ /HPF 3+ !       Review of Systems  A complete review of systems was performed. All systems are noted to be negative unless indicated in the history of present illness, impression, active problem list, or past histories.     Objective  Physical Exam  General: Well developed, well nourished, alert and cooperative, appears in no acute distress  Eyes: Non-injected conjunctiva, sclera clear, no proptosis  Cardiac: Extremities are warm and well perfused. No edema, cyanosis or pallor.   Lungs: Breathing is easy, non-labored. Speaking in clear and complete sentences. Normal diaphragmatic movement.  Abdomen: soft, non-tender, no CVA tenderness   MSK: Ambulatory with steady gait, unassisted  Neuro: alert and oriented to person, place and time  Psych: Demonstrates good judgement and reason, without hallucinations, abnormal affect or abnormal behaviors.  Skin: no obvious lesions, no rashes.      Assessment/Plan  Diagnoses and all orders for this visit:  Urinary tract  obstruction by kidney stone  -     Referral to Urology  Renal calculi  -     Referral to Urology  -     Case Request Operating Room: Cystoscopy with Lithotripsy Laser; Standing  Other orders  -     NPO Diet Except: Sips with meds; Effective now; Standing  -     Height and weight; Standing  -     Insert and maintain peripheral IV; Standing  -     Saline lock IV; Standing  -     POCT pregnancy, urine; Standing  -     Place in outpatient/hospital ambulatory surgery; Standing  -     Full code; Standing  -     ceFAZolin (Ancef) 2 g in dextrose (iso)  mL      49 y.o. female presents with severe pain in the LLQ 3-4 times per week. The pain causes emesis occasionally. She expresses the pain is not accompanied by nausea.     I personally reviewed the medical records of the patient including the lab values, the note of the referring physician and I reviewed the report and visualized the images performed focusing on the CT abdomen completed on 12/09/2024. CT Abdomen results read an obstructing left renal calculus measuring 2.3 cm. Along with a non obstructing left renal calculi measuring 0.5 cm within the inferior pole.     I discussed with the patient the management options for kidney and ureteral stones, including observation, medical expulsive therapy, stent placement, rigid or flexible ureteroscopy with stone extraction or laser stone fragmentation, ESWL (extracorporeal shock wave lithotripsy), or more advanced options for bigger stones such as percutaneous or surgical approaches. The approach depends on the size and location of the stone as well as any associated infection.     For ureteral stones, medical expulsive therapy is attempted for smaller stones however intervention is indicated in severe pain not controlled by medical therapy, intractable nausea or vomiting, and fever or chills.     In this case, I recommended a left urethoscopy with laser lithotripsy based on the proximal location of the stone.     ESWL  is noninvasive and requires no stent in the majority of cases, however the stone free rate is lower than ureteroscopy which is more invasive and requires a stent uniformity.     The patient agreed to proceed with ESWL after completing the course of antibiotics.     I have advised her that I do not think waiting until 02/2025.     I have advised her that she should lay on her left side. This will allow the stone to go back to the kidney to allow the kidney where to flow.     Plan:  Left urethoscopy with laser lithotripsy on 12/27/2024 in [place].       Scribe Attestation   By signing my name below, I, Aden Castañeda attestation that this documentation has been prepared under the direction and in the presence of Tanya Martinez MD.

## 2024-12-17 PROBLEM — N20.0 RENAL CALCULI: Status: ACTIVE | Noted: 2024-12-16

## 2024-12-17 RX ORDER — CEFAZOLIN SODIUM 2 G/100ML
2 INJECTION, SOLUTION INTRAVENOUS ONCE
OUTPATIENT
Start: 2024-12-17 | End: 2024-12-17

## 2024-12-23 ENCOUNTER — ANESTHESIA EVENT (OUTPATIENT)
Dept: OPERATING ROOM | Facility: HOSPITAL | Age: 49
End: 2024-12-23
Payer: COMMERCIAL

## 2024-12-23 ENCOUNTER — PRE-ADMISSION TESTING (OUTPATIENT)
Dept: PREADMISSION TESTING | Facility: HOSPITAL | Age: 49
End: 2024-12-23
Payer: COMMERCIAL

## 2024-12-23 VITALS — BODY MASS INDEX: 29.66 KG/M2 | HEIGHT: 67 IN | WEIGHT: 189 LBS

## 2024-12-23 DIAGNOSIS — N20.0 RENAL CALCULI: Primary | ICD-10-CM

## 2024-12-23 DIAGNOSIS — Z01.818 PREOP TESTING: ICD-10-CM

## 2024-12-23 PROBLEM — Z98.890 PONV (POSTOPERATIVE NAUSEA AND VOMITING): Status: ACTIVE | Noted: 2024-12-23

## 2024-12-23 PROBLEM — R11.2 PONV (POSTOPERATIVE NAUSEA AND VOMITING): Status: ACTIVE | Noted: 2024-12-23

## 2024-12-23 RX ORDER — MULTIVITAMIN/IRON/FOLIC ACID 18MG-0.4MG
1 TABLET ORAL DAILY
COMMUNITY

## 2024-12-23 RX ORDER — VIT C/E/ZN/COPPR/LUTEIN/ZEAXAN 250MG-90MG
25 CAPSULE ORAL DAILY
COMMUNITY

## 2024-12-23 ASSESSMENT — DUKE ACTIVITY SCORE INDEX (DASI)
CAN YOU WALK INDOORS, SUCH AS AROUND YOUR HOUSE: YES
CAN YOU DO LIGHT WORK AROUND THE HOUSE LIKE DUSTING OR WASHING DISHES: YES
CAN YOU TAKE CARE OF YOURSELF (EAT, DRESS, BATHE, OR USE TOILET): YES
CAN YOU PARTICIPATE IN MODERATE RECREATIONAL ACTIVITIES LIKE GOLF, BOWLING, DANCING, DOUBLES TENNIS OR THROWING A BASEBALL OR FOOTBALL: YES
CAN YOU WALK A BLOCK OR TWO ON LEVEL GROUND: YES
CAN YOU DO HEAVY WORK AROUND THE HOUSE LIKE SCRUBBING FLOORS OR LIFTING AND MOVING HEAVY FURNITURE: YES
DASI METS SCORE: 8
CAN YOU DO YARD WORK LIKE RAKING LEAVES, WEEDING OR PUSHING A MOWER: YES
TOTAL_SCORE: 42.7
CAN YOU HAVE SEXUAL RELATIONS: YES
CAN YOU PARTICIPATE IN STRENOUS SPORTS LIKE SWIMMING, SINGLES TENNIS, FOOTBALL, BASKETBALL, OR SKIING: NO
CAN YOU DO MODERATE WORK AROUND THE HOUSE LIKE VACUUMING, SWEEPING FLOORS OR CARRYING GROCERIES: YES
CAN YOU RUN A SHORT DISTANCE: NO
CAN YOU CLIMB A FLIGHT OF STAIRS OR WALK UP A HILL: YES

## 2024-12-23 NOTE — PREPROCEDURE INSTRUCTIONS
Medication List            Accurate as of December 23, 2024  7:26 AM. Always use your most recent med list.                b complex 0.4 mg tablet  Medication Adjustments for Surgery: Take last dose 1 day (24 hours) before surgery     norethindrone ac-eth estradioL 1-5 mg-mcg tablet  Commonly known as: Femhrt 1/5  Take 1 tablet by mouth once every 24 hours.  Medication Adjustments for Surgery: Take last dose 1 day (24 hours) before surgery     OSTEO BI-FLEX ORAL  Medication Adjustments for Surgery: Take last dose 1 day (24 hours) before surgery     Vitamin D3 25 MCG (1000 UT) capsule  Generic drug: cholecalciferol  Medication Adjustments for Surgery: Take last dose 1 day (24 hours) before surgery                              NPO Instructions:    Do not eat any food after midnight the night before your surgery/procedure.  May have clears up to 3 hours preop. Water, Black coffee, tea, gatorade, and clear soda. No dairy or non-dairy products added.   Remember to stop drinking by mouth 3 hours prior to procedure. No gum, candy, mints or smoking.      Additional Instructions:     Review your medication instructions, take indicated medications  Wear  comfortable loose fitting clothing  All jewelry and valuables should be left at home    Park in back of hospital by ER. Come up to Second floor-Outpt dept to check in.  Bring Photo ID and Insurance card,   You MUST have a  with you.  No more than 2 visitors with you please.      If you get ill at all before your procedure- CALL YOUR DOCTOR/SURGEON.  We want you in the best shape that is possible. Any sickness might lead to your procedure being delayed.      Call Outpatient dept at [phone] the night before your procedure (Friday for Monday procedure), between 1-3 pm.      We will need a urine sample on admission and we will do an EKG. Check in with Outpt dept and we can do it there.

## 2024-12-23 NOTE — ANESTHESIA PREPROCEDURE EVALUATION
Patient: Ange Mcdonald    Procedure Information       Date/Time: 12/27/24 0915    Procedure: Cystoscopy with Lithotripsy Laser    Location: [facility] / [facility]    Surgeons: Tanya Martinez MD            Relevant Problems   Anesthesia   (+) PONV (postoperative nausea and vomiting)      Cardiac (within normal limits)      Pulmonary (within normal limits)      Neuro (within normal limits)      GI (within normal limits)      /Renal   (+) Renal calculi      Liver (within normal limits)      Endocrine (within normal limits)      Hematology (within normal limits)      Musculoskeletal (within normal limits)      HEENT (within normal limits)      ID (within normal limits)      Skin (within normal limits)      GYN (within normal limits)     There were no vitals filed for this visit.    Past Surgical History:   Procedure Laterality Date    HERNIA REPAIR      Inguinal -child    MANDIBLE SURGERY      Reconstruction Jaw     Past Medical History:   Diagnosis Date    PONV (postoperative nausea and vomiting)      No current facility-administered medications for this encounter.    Current Outpatient Medications:     b complex 0.4 mg tablet, Take 1 tablet by mouth once daily., Disp: , Rfl:     cholecalciferol (Vitamin D3) 25 MCG (1000 UT) capsule, Take 1 capsule (25 mcg) by mouth once daily., Disp: , Rfl:     glucosamine/chondr rocha A sod (OSTEO BI-FLEX ORAL), Take 1 tablet by mouth early in the morning.., Disp: , Rfl:     norethindrone ac-eth estradioL (Femhrt 1/5) 1-5 mg-mcg tablet, Take 1 tablet by mouth once every 24 hours., Disp: 90 tablet, Rfl: 3  Prior to Admission medications    Medication Sig Start Date End Date Taking? Authorizing Provider   b complex 0.4 mg tablet Take 1 tablet by mouth once daily.    Historical Provider, MD   cholecalciferol (Vitamin D3) 25 MCG (1000 UT) capsule Take 1 capsule (25 mcg) by mouth once daily.    Historical Provider, MD   glucosamine/chondr rocha A sod (OSTEO BI-FLEX ORAL) Take 1  "tablet by mouth early in the morning..    Historical Provider, MD   norethindrone ac-eth estradioL (Femhrt 1/5) 1-5 mg-mcg tablet Take 1 tablet by mouth once every 24 hours. 4/22/24 4/22/25  Delma Zamarripa, DO     No Known Allergies  Social History     Tobacco Use    Smoking status: Never    Smokeless tobacco: Never   Substance Use Topics    Alcohol use: Yes     Comment: 1 x month         Chemistry    Lab Results   Component Value Date/Time     11/25/2024 1601    K 4.5 11/25/2024 1601     11/25/2024 1601    CO2 26 11/25/2024 1601    BUN 14 11/25/2024 1601    CREATININE 0.73 11/25/2024 1601    Lab Results   Component Value Date/Time    CALCIUM 9.0 11/25/2024 1601    ALKPHOS 57 11/25/2024 1601    AST 11 11/25/2024 1601    ALT 7 11/25/2024 1601    BILITOT 0.4 11/25/2024 1601          Lab Results   Component Value Date/Time    WBC 8.7 11/25/2024 1601    HGB 13.2 11/25/2024 1601    HCT 41.2 11/25/2024 1601     11/25/2024 1601     No results found for: \"PROTIME\", \"PTT\", \"INR\"  No results found for this or any previous visit (from the past 4464 hours).  No results found for this or any previous visit from the past 1095 days.    Clinical information reviewed:                 Chart reviewed.  No clearances ordered.    NPO Detail:  No data recorded     Physical Exam    Airway  Mallampati: II     Cardiovascular - normal exam     Dental - normal exam     Pulmonary - normal exam     Abdominal - normal exam             Anesthesia Plan    History of general anesthesia?: yes  History of complications of general anesthesia?: yes    ASA 2     general   (Hx PONV)  The patient is not a current smoker.  Patient did not smoke on day of procedure.    intravenous induction   Anesthetic plan and risks discussed with patient.      "

## 2024-12-24 RX ORDER — OXYCODONE HYDROCHLORIDE 5 MG/1
5 TABLET ORAL EVERY 4 HOURS PRN
Status: CANCELLED | OUTPATIENT
Start: 2024-12-24

## 2024-12-24 RX ORDER — ONDANSETRON HYDROCHLORIDE 2 MG/ML
4 INJECTION, SOLUTION INTRAVENOUS ONCE AS NEEDED
Status: CANCELLED | OUTPATIENT
Start: 2024-12-24

## 2024-12-24 RX ORDER — ACETAMINOPHEN 325 MG/1
650 TABLET ORAL EVERY 4 HOURS PRN
Status: CANCELLED | OUTPATIENT
Start: 2024-12-24

## 2024-12-24 RX ORDER — OXYCODONE HYDROCHLORIDE 5 MG/1
10 TABLET ORAL EVERY 4 HOURS PRN
Status: CANCELLED | OUTPATIENT
Start: 2024-12-24

## 2024-12-27 ENCOUNTER — PHARMACY VISIT (OUTPATIENT)
Dept: PHARMACY | Facility: CLINIC | Age: 49
End: 2024-12-27
Payer: MEDICARE

## 2024-12-27 ENCOUNTER — HOSPITAL ENCOUNTER (OUTPATIENT)
Dept: CARDIOLOGY | Facility: HOSPITAL | Age: 49
Discharge: HOME | End: 2024-12-27
Payer: COMMERCIAL

## 2024-12-27 ENCOUNTER — ANESTHESIA (OUTPATIENT)
Dept: OPERATING ROOM | Facility: HOSPITAL | Age: 49
End: 2024-12-27
Payer: COMMERCIAL

## 2024-12-27 ENCOUNTER — HOSPITAL ENCOUNTER (OUTPATIENT)
Facility: HOSPITAL | Age: 49
Setting detail: OUTPATIENT SURGERY
Discharge: HOME | End: 2024-12-27
Attending: STUDENT IN AN ORGANIZED HEALTH CARE EDUCATION/TRAINING PROGRAM | Admitting: STUDENT IN AN ORGANIZED HEALTH CARE EDUCATION/TRAINING PROGRAM
Payer: COMMERCIAL

## 2024-12-27 ENCOUNTER — APPOINTMENT (OUTPATIENT)
Dept: RADIOLOGY | Facility: HOSPITAL | Age: 49
End: 2024-12-27
Payer: COMMERCIAL

## 2024-12-27 VITALS
OXYGEN SATURATION: 99 % | RESPIRATION RATE: 18 BRPM | HEART RATE: 69 BPM | TEMPERATURE: 96.8 F | DIASTOLIC BLOOD PRESSURE: 70 MMHG | SYSTOLIC BLOOD PRESSURE: 113 MMHG

## 2024-12-27 DIAGNOSIS — Z98.890 PONV (POSTOPERATIVE NAUSEA AND VOMITING): Primary | ICD-10-CM

## 2024-12-27 DIAGNOSIS — N20.0 RENAL CALCULI: ICD-10-CM

## 2024-12-27 DIAGNOSIS — R11.2 PONV (POSTOPERATIVE NAUSEA AND VOMITING): Primary | ICD-10-CM

## 2024-12-27 LAB
APPEARANCE UR: CLEAR
BACTERIA #/AREA URNS AUTO: ABNORMAL /HPF
BILIRUB UR STRIP.AUTO-MCNC: NEGATIVE MG/DL
COLOR UR: COLORLESS
GLUCOSE UR STRIP.AUTO-MCNC: NORMAL MG/DL
HCG UR QL IA.RAPID: NEGATIVE
HOLD SPECIMEN: NORMAL
KETONES UR STRIP.AUTO-MCNC: NEGATIVE MG/DL
LEUKOCYTE ESTERASE UR QL STRIP.AUTO: ABNORMAL
MUCOUS THREADS #/AREA URNS AUTO: ABNORMAL /LPF
NITRITE UR QL STRIP.AUTO: NEGATIVE
PH UR STRIP.AUTO: 6.5 [PH]
PROT UR STRIP.AUTO-MCNC: NEGATIVE MG/DL
RBC # UR STRIP.AUTO: ABNORMAL /UL
RBC #/AREA URNS AUTO: ABNORMAL /HPF
SP GR UR STRIP.AUTO: 1
SQUAMOUS #/AREA URNS AUTO: ABNORMAL /HPF
UROBILINOGEN UR STRIP.AUTO-MCNC: NORMAL MG/DL
WBC #/AREA URNS AUTO: ABNORMAL /HPF

## 2024-12-27 PROCEDURE — 74420 UROGRAPHY RTRGR +-KUB: CPT | Performed by: STUDENT IN AN ORGANIZED HEALTH CARE EDUCATION/TRAINING PROGRAM

## 2024-12-27 PROCEDURE — 81025 URINE PREGNANCY TEST: CPT | Performed by: PHYSICIAN ASSISTANT

## 2024-12-27 PROCEDURE — 2780000003 HC OR 278 NO HCPCS: Performed by: STUDENT IN AN ORGANIZED HEALTH CARE EDUCATION/TRAINING PROGRAM

## 2024-12-27 PROCEDURE — 7100000009 HC PHASE TWO TIME - INITIAL BASE CHARGE: Performed by: STUDENT IN AN ORGANIZED HEALTH CARE EDUCATION/TRAINING PROGRAM

## 2024-12-27 PROCEDURE — 3700000001 HC GENERAL ANESTHESIA TIME - INITIAL BASE CHARGE: Performed by: STUDENT IN AN ORGANIZED HEALTH CARE EDUCATION/TRAINING PROGRAM

## 2024-12-27 PROCEDURE — 3600000008 HC OR TIME - EACH INCREMENTAL 1 MINUTE - PROCEDURE LEVEL THREE: Performed by: STUDENT IN AN ORGANIZED HEALTH CARE EDUCATION/TRAINING PROGRAM

## 2024-12-27 PROCEDURE — RXMED WILLOW AMBULATORY MEDICATION CHARGE

## 2024-12-27 PROCEDURE — 76000 FLUOROSCOPY <1 HR PHYS/QHP: CPT

## 2024-12-27 PROCEDURE — 2500000004 HC RX 250 GENERAL PHARMACY W/ HCPCS (ALT 636 FOR OP/ED): Performed by: STUDENT IN AN ORGANIZED HEALTH CARE EDUCATION/TRAINING PROGRAM

## 2024-12-27 PROCEDURE — 52356 CYSTO/URETERO W/LITHOTRIPSY: CPT | Performed by: STUDENT IN AN ORGANIZED HEALTH CARE EDUCATION/TRAINING PROGRAM

## 2024-12-27 PROCEDURE — 81001 URINALYSIS AUTO W/SCOPE: CPT

## 2024-12-27 PROCEDURE — 2500000002 HC RX 250 W HCPCS SELF ADMINISTERED DRUGS (ALT 637 FOR MEDICARE OP, ALT 636 FOR OP/ED)

## 2024-12-27 PROCEDURE — 2720000007 HC OR 272 NO HCPCS: Performed by: STUDENT IN AN ORGANIZED HEALTH CARE EDUCATION/TRAINING PROGRAM

## 2024-12-27 PROCEDURE — C2617 STENT, NON-COR, TEM W/O DEL: HCPCS | Performed by: STUDENT IN AN ORGANIZED HEALTH CARE EDUCATION/TRAINING PROGRAM

## 2024-12-27 PROCEDURE — 7100000001 HC RECOVERY ROOM TIME - INITIAL BASE CHARGE: Performed by: STUDENT IN AN ORGANIZED HEALTH CARE EDUCATION/TRAINING PROGRAM

## 2024-12-27 PROCEDURE — C1769 GUIDE WIRE: HCPCS | Performed by: STUDENT IN AN ORGANIZED HEALTH CARE EDUCATION/TRAINING PROGRAM

## 2024-12-27 PROCEDURE — 2500000004 HC RX 250 GENERAL PHARMACY W/ HCPCS (ALT 636 FOR OP/ED): Performed by: NURSE ANESTHETIST, CERTIFIED REGISTERED

## 2024-12-27 PROCEDURE — 7100000010 HC PHASE TWO TIME - EACH INCREMENTAL 1 MINUTE: Performed by: STUDENT IN AN ORGANIZED HEALTH CARE EDUCATION/TRAINING PROGRAM

## 2024-12-27 PROCEDURE — C1894 INTRO/SHEATH, NON-LASER: HCPCS | Performed by: STUDENT IN AN ORGANIZED HEALTH CARE EDUCATION/TRAINING PROGRAM

## 2024-12-27 PROCEDURE — 3700000002 HC GENERAL ANESTHESIA TIME - EACH INCREMENTAL 1 MINUTE: Performed by: STUDENT IN AN ORGANIZED HEALTH CARE EDUCATION/TRAINING PROGRAM

## 2024-12-27 PROCEDURE — 93005 ELECTROCARDIOGRAM TRACING: CPT

## 2024-12-27 PROCEDURE — 87086 URINE CULTURE/COLONY COUNT: CPT | Mod: GENLAB

## 2024-12-27 PROCEDURE — 2721000 HC OR 272 NO HCPCS: Performed by: STUDENT IN AN ORGANIZED HEALTH CARE EDUCATION/TRAINING PROGRAM

## 2024-12-27 PROCEDURE — 3600000003 HC OR TIME - INITIAL BASE CHARGE - PROCEDURE LEVEL THREE: Performed by: STUDENT IN AN ORGANIZED HEALTH CARE EDUCATION/TRAINING PROGRAM

## 2024-12-27 PROCEDURE — 2500000005 HC RX 250 GENERAL PHARMACY W/O HCPCS: Performed by: STUDENT IN AN ORGANIZED HEALTH CARE EDUCATION/TRAINING PROGRAM

## 2024-12-27 PROCEDURE — 7100000002 HC RECOVERY ROOM TIME - EACH INCREMENTAL 1 MINUTE: Performed by: STUDENT IN AN ORGANIZED HEALTH CARE EDUCATION/TRAINING PROGRAM

## 2024-12-27 PROCEDURE — 2550000001 HC RX 255 CONTRASTS: Performed by: STUDENT IN AN ORGANIZED HEALTH CARE EDUCATION/TRAINING PROGRAM

## 2024-12-27 DEVICE — IMAJIN™ DOUBLE LOOP URETERAL STENT SILICONE HYDRO-COATED OPEN/OPEN CH FR 06 LENGTH 24 CM WITH STEERABLE PUSHER
Type: IMPLANTABLE DEVICE | Site: URETER | Status: FUNCTIONAL
Brand: PORGES COLOPLAST

## 2024-12-27 RX ORDER — TAMSULOSIN HYDROCHLORIDE 0.4 MG/1
0.4 CAPSULE ORAL DAILY
Qty: 30 CAPSULE | Refills: 0 | Status: SHIPPED | OUTPATIENT
Start: 2024-12-27

## 2024-12-27 RX ORDER — SODIUM CHLORIDE 0.9 G/100ML
IRRIGANT IRRIGATION AS NEEDED
Status: DISCONTINUED | OUTPATIENT
Start: 2024-12-27 | End: 2024-12-27 | Stop reason: HOSPADM

## 2024-12-27 RX ORDER — PHENAZOPYRIDINE HYDROCHLORIDE 200 MG/1
200 TABLET, FILM COATED ORAL 3 TIMES DAILY PRN
Qty: 30 TABLET | Refills: 0 | Status: SHIPPED | OUTPATIENT
Start: 2024-12-27

## 2024-12-27 RX ORDER — MIDAZOLAM HYDROCHLORIDE 1 MG/ML
INJECTION INTRAMUSCULAR; INTRAVENOUS AS NEEDED
Status: DISCONTINUED | OUTPATIENT
Start: 2024-12-27 | End: 2024-12-27

## 2024-12-27 RX ORDER — LIDOCAINE HYDROCHLORIDE 20 MG/ML
INJECTION, SOLUTION INFILTRATION; PERINEURAL AS NEEDED
Status: DISCONTINUED | OUTPATIENT
Start: 2024-12-27 | End: 2024-12-27

## 2024-12-27 RX ORDER — PROPOFOL 10 MG/ML
INJECTION, EMULSION INTRAVENOUS AS NEEDED
Status: DISCONTINUED | OUTPATIENT
Start: 2024-12-27 | End: 2024-12-27

## 2024-12-27 RX ORDER — SODIUM CHLORIDE 9 MG/ML
INJECTION, SOLUTION INTRAVENOUS CONTINUOUS PRN
Status: DISCONTINUED | OUTPATIENT
Start: 2024-12-27 | End: 2024-12-27

## 2024-12-27 RX ORDER — CEFAZOLIN SODIUM 2 G/50ML
2 SOLUTION INTRAVENOUS ONCE
Status: COMPLETED | OUTPATIENT
Start: 2024-12-27 | End: 2024-12-27

## 2024-12-27 RX ORDER — FENTANYL CITRATE 50 UG/ML
INJECTION, SOLUTION INTRAMUSCULAR; INTRAVENOUS AS NEEDED
Status: DISCONTINUED | OUTPATIENT
Start: 2024-12-27 | End: 2024-12-27

## 2024-12-27 RX ORDER — ONDANSETRON HYDROCHLORIDE 2 MG/ML
INJECTION, SOLUTION INTRAVENOUS AS NEEDED
Status: DISCONTINUED | OUTPATIENT
Start: 2024-12-27 | End: 2024-12-27

## 2024-12-27 RX ORDER — OXYBUTYNIN CHLORIDE 5 MG/1
5 TABLET ORAL 3 TIMES DAILY
Qty: 30 TABLET | Refills: 0 | Status: SHIPPED | OUTPATIENT
Start: 2024-12-27

## 2024-12-27 RX ORDER — APREPITANT 40 MG/1
40 CAPSULE ORAL ONCE
Status: COMPLETED | OUTPATIENT
Start: 2024-12-27 | End: 2024-12-27

## 2024-12-27 RX ORDER — OXYCODONE HYDROCHLORIDE 5 MG/1
5 TABLET ORAL EVERY 6 HOURS PRN
Qty: 12 TABLET | Refills: 0 | Status: SHIPPED | OUTPATIENT
Start: 2024-12-27

## 2024-12-27 RX ORDER — KETOROLAC TROMETHAMINE 30 MG/ML
INJECTION, SOLUTION INTRAMUSCULAR; INTRAVENOUS AS NEEDED
Status: DISCONTINUED | OUTPATIENT
Start: 2024-12-27 | End: 2024-12-27

## 2024-12-27 RX ADMIN — APREPITANT 40 MG: 40 CAPSULE ORAL at 08:03

## 2024-12-27 SDOH — HEALTH STABILITY: MENTAL HEALTH: CURRENT SMOKER: 0

## 2024-12-27 ASSESSMENT — COLUMBIA-SUICIDE SEVERITY RATING SCALE - C-SSRS
2. HAVE YOU ACTUALLY HAD ANY THOUGHTS OF KILLING YOURSELF?: NO
6. HAVE YOU EVER DONE ANYTHING, STARTED TO DO ANYTHING, OR PREPARED TO DO ANYTHING TO END YOUR LIFE?: NO
1. IN THE PAST MONTH, HAVE YOU WISHED YOU WERE DEAD OR WISHED YOU COULD GO TO SLEEP AND NOT WAKE UP?: NO

## 2024-12-27 ASSESSMENT — PAIN SCALES - GENERAL
PAINLEVEL_OUTOF10: 0 - NO PAIN
PAIN_LEVEL: 0

## 2024-12-27 ASSESSMENT — PAIN - FUNCTIONAL ASSESSMENT: PAIN_FUNCTIONAL_ASSESSMENT: 0-10

## 2024-12-27 NOTE — OP NOTE
Ureteroscopy with Lithotripsy Laser, double J stent insertion, retrograde pyelogram (L) Operative Note     Date: 2024  OR Location: [facility]    Name: Ange Mcdonald, : 1975, Age: 49 y.o., MRN: [id number], Sex: female    Diagnosis  Pre-op Diagnosis      * Renal calculi [N20.0] Post-op Diagnosis     * Renal calculi [N20.0]     Procedures  Ureteroscopy with Lithotripsy Laser, double J stent insertion, retrograde pyelogram  84377 - [place] CYSTO/URETERO W/LITHOTRIPSY &INDWELL STENT INSRT    CHG UROGRAPHY RETROGRADE WITH/WO KUB [64429]  Surgeons      * Tanya Martinez - Primary    Resident/Fellow/Other Assistant:  Surgeons and Role:  * No surgeons found with a matching role *    Staff:   Scrub Person: Carlos  Circulator: Mary  Circulator: Madeline  Surgical Assistant: Andrés    Anesthesia Staff: CRNA: KRISHNA Lopez-IGOR    Procedure Summary  Anesthesia: General  ASA: II  Estimated Blood Loss: 0 mL  Intra-op Medications:   Administrations occurring from 0900 to 1055 on 24:   Medication Name Total Dose   iohexol (OMNIPaque) 240 mg iodine/mL solution 14 mL   dexAMETHasone (Decadron) injection 4 mg/mL 8 mg   fentaNYL (Sublimaze) injection 50 mcg/mL 100 mcg   HYDROmorphone (Dilaudid) injection 0.5 mg/0.5 mL 0.5 mg   ketorolac (Toradol) injection 30 mg 30 mg   lidocaine (Xylocaine) injection 2 % 40 mg   midazolam PF (Versed) injection 1 mg/mL 2 mg   ondansetron (Zofran) 2 mg/mL injection 4 mg   propofol (Diprivan) IV infusion 200 mg   sodium chloride 0.9 % infusion 143.75 mL   ceFAZolin (Ancef) 2 g in dextrose (iso) IV 50 mL 2 g              Anesthesia Record               Intraprocedure I/O Totals          Intake    Propofol Drip 0.00 mL    The total shown is the total volume documented since Anesthesia Start was filed.    sodium chloride 0.9% 250.00 mL    ceFAZolin (Ancef) 2 g in dextrose (iso) IV 50 mL 50.00 mL    Total Intake 300 mL          Specimen: No specimens collected              Drains  and/or Catheters:   Ureteral Drain/Stent Left ureter 6 Fr. (Active)       Tourniquet Times:         Implants:  Implants       Type Name Action Serial No.      Implant STENT KIT, IMAJIN HYDRO, 6FR X 24CM, POSITIONER, NO GUIDEWIRE - [id number] Implanted               Findings: large 2.5cm renal stone in the left kidney    Indications: Ange Mcdonald is an 49 y.o. female who is having surgery for Renal calculi [N20.0]. Complained of significant left pain for which a CT was performed showing the stone.    The patient was seen in the preoperative area. The risks, benefits, complications, treatment options, non-operative alternatives, expected recovery and outcomes were discussed with the patient. The possibilities of reaction to medication, pulmonary aspiration, injury to surrounding structures, bleeding, recurrent infection, the need for additional procedures, failure to diagnose a condition, and creating a complication requiring transfusion or operation were discussed with the patient. The patient concurred with the proposed plan, giving informed consent.  The site of surgery was properly noted/marked if necessary per policy. The patient has been actively warmed in preoperative area. Preoperative antibiotics have been ordered and given within 1 hours of incision. Venous thrombosis prophylaxis have been ordered including bilateral sequential compression devices    Procedure Details: Patient was consented and antibiotics were started on call to OR.  In the operating room, under general anesthesia, with the patient in dorsolithotomy position, genitalia was prepped and draped in the usual manner.  Initially the cystoscope could not be inserted because of urethral narrowing as well as a skin lesion at the urethral meatus extending down to the anterior wall of the vagina, small in size and mildly elevated. I dilated the urethral meatus serially. Then a 22 cystoscope was inserted down the urethra into the bladder, and  cystoscopy revealed no stones or tumors. The ureteral orifices were identified in the normal orthotopic position.  The ureteral catheter was advanced through the cystoscope and a guidewire was inserted through the left ureteral orifice.  The cystoscope was removed and a ureteral 10-12 x 35 cm access sheath was advanced over the wire until it was positioned near the ureteropelvic junction.  The inner sheath was removed.  The flexible ureteroscope was inserted through and pyeloscopy was performed revealing the stone in the renal pelvis.  Using the 365 micron laser fiber, the stone was fragmented into medium and small pieces that filled the entirety of the middle calyces.  At that point and after nearly an hour of work, the visibility became limited and we decided as planned to stage the procedure and return in several weeks to finish off the stones.    Contrast was injected through the ureteroscope, showing moderate hydronephrosis, and filling defects as expected in the middle calyx.    Flexible ureteroscope and the ureteral access sheath were extracted under vision.  The cystoscope was reinserted over the wire.  A 6-24 double-J stent was inserted over the wire and position was adjusted by the pusher.  The proximal curl was seen by fluoroscopy to be in the topography of the kidney, while the distal curl was visualized directly in the bladder.    The bladder was emptied.  This concluded the procedure.  Patient tolerated the procedure well, was awakened and transferred to PACU in stable condition    Complications:  None; patient tolerated the procedure well.    Disposition: PACU - hemodynamically stable.  Condition: stable         Task Performed by RNFA or Surgical Assistant:      Additional Details: second stage of the procedure to be performed in 3-5 weeks    Attending Attestation: I performed the procedure.    Tanya Martinez  Phone Number: [phone]

## 2024-12-27 NOTE — ANESTHESIA PROCEDURE NOTES
Airway  Date/Time: 12/27/2024 9:56 AM  Urgency: elective    Airway not difficult    Staffing  Performed: CRNA   Authorized by: ABHISHEK Lopez    Performed by: ABHISHEK Lopez  Patient location during procedure: OR    Indications and Patient Condition  Indications for airway management: anesthesia  Spontaneous Ventilation: absent  Sedation level: deep  Preoxygenated: yes  Patient position: sniffing  MILS maintained throughout  Mask difficulty assessment: 0 - not attempted    Final Airway Details  Final airway type: supraglottic airway      Successful airway: Size 4     Number of attempts at approach: 1  Number of other approaches attempted: 0

## 2024-12-27 NOTE — ANESTHESIA POSTPROCEDURE EVALUATION
Patient: Ange Mcdonald    Procedure Summary       Date: 12/27/24 Room / Location: [facility] 03 / [facility]    Anesthesia Start: 0945 Anesthesia Stop: 1108    Procedure: Ureteroscopy with Lithotripsy Laser, double J stent insertion, retrograde pyelogram (Left) Diagnosis:       Renal calculi      (Renal calculi [N20.0])    Surgeons: Tanya Martinez MD Responsible Provider: ABHISHEK Lopez    Anesthesia Type: general ASA Status: 2            Anesthesia Type: general    Vitals Value Taken Time   BP 94/69 12/27/24 1111   Temp 36 °C (96.8 °F) 12/27/24 1106   Pulse 62 12/27/24 1111   Resp 16 12/27/24 1111   SpO2 95 % 12/27/24 1111       Anesthesia Post Evaluation    Patient location during evaluation: PACU  Patient participation: complete - patient participated  Level of consciousness: awake  Pain score: 0  Pain management: adequate  Multimodal analgesia pain management approach  Airway patency: patent  Two or more strategies used to mitigate risk of obstructive sleep apnea  Cardiovascular status: acceptable and stable  Respiratory status: acceptable and room air  Hydration status: acceptable  Postoperative Nausea and Vomiting: none        No notable events documented.

## 2024-12-28 LAB — BACTERIA UR CULT: NORMAL

## 2024-12-31 LAB
ATRIAL RATE: 57 BPM
P AXIS: 73 DEGREES
P OFFSET: 210 MS
P ONSET: 159 MS
PR INTERVAL: 128 MS
Q ONSET: 223 MS
QRS COUNT: 9 BEATS
QRS DURATION: 92 MS
QT INTERVAL: 408 MS
QTC CALCULATION(BAZETT): 397 MS
QTC FREDERICIA: 401 MS
R AXIS: 72 DEGREES
T AXIS: 65 DEGREES
T OFFSET: 427 MS
VENTRICULAR RATE: 57 BPM

## 2025-01-03 ENCOUNTER — PREP FOR PROCEDURE (OUTPATIENT)
Dept: UROLOGY | Facility: HOSPITAL | Age: 50
End: 2025-01-03
Payer: COMMERCIAL

## 2025-01-03 DIAGNOSIS — R39.9 LOWER URINARY TRACT SYMPTOMS (LUTS): ICD-10-CM

## 2025-01-03 DIAGNOSIS — N20.0 KIDNEY STONE ON LEFT SIDE: Primary | ICD-10-CM

## 2025-01-13 ENCOUNTER — LAB (OUTPATIENT)
Dept: LAB | Facility: LAB | Age: 50
End: 2025-01-13
Payer: COMMERCIAL

## 2025-01-13 ENCOUNTER — TELEPHONE (OUTPATIENT)
Dept: UROLOGY | Facility: HOSPITAL | Age: 50
End: 2025-01-13
Payer: COMMERCIAL

## 2025-01-13 DIAGNOSIS — N20.0 URINARY TRACT OBSTRUCTION BY KIDNEY STONE: ICD-10-CM

## 2025-01-13 DIAGNOSIS — N13.8 URINARY TRACT OBSTRUCTION BY KIDNEY STONE: ICD-10-CM

## 2025-01-13 DIAGNOSIS — N20.0 URINARY TRACT OBSTRUCTION BY KIDNEY STONE: Primary | ICD-10-CM

## 2025-01-13 DIAGNOSIS — N13.8 URINARY TRACT OBSTRUCTION BY KIDNEY STONE: Primary | ICD-10-CM

## 2025-01-13 LAB
APPEARANCE UR: CLEAR
BACTERIA #/AREA URNS AUTO: ABNORMAL /HPF
BILIRUB UR STRIP.AUTO-MCNC: NEGATIVE MG/DL
COLOR UR: ABNORMAL
GLUCOSE UR STRIP.AUTO-MCNC: NORMAL MG/DL
KETONES UR STRIP.AUTO-MCNC: NEGATIVE MG/DL
LEUKOCYTE ESTERASE UR QL STRIP.AUTO: ABNORMAL
MUCOUS THREADS #/AREA URNS AUTO: ABNORMAL /LPF
NITRITE UR QL STRIP.AUTO: NEGATIVE
PH UR STRIP.AUTO: 5.5 [PH]
PROT UR STRIP.AUTO-MCNC: ABNORMAL MG/DL
RBC # UR STRIP.AUTO: ABNORMAL /UL
RBC #/AREA URNS AUTO: ABNORMAL /HPF
SP GR UR STRIP.AUTO: 1.01
SQUAMOUS #/AREA URNS AUTO: ABNORMAL /HPF
UROBILINOGEN UR STRIP.AUTO-MCNC: NORMAL MG/DL
WBC #/AREA URNS AUTO: ABNORMAL /HPF
WBC CLUMPS #/AREA URNS AUTO: ABNORMAL /HPF

## 2025-01-13 PROCEDURE — 81001 URINALYSIS AUTO W/SCOPE: CPT

## 2025-01-13 PROCEDURE — 87086 URINE CULTURE/COLONY COUNT: CPT

## 2025-01-13 NOTE — TELEPHONE ENCOUNTER
Patient called and left message requesting order for UCX to be placed. I called patient at [phone]  to inform her UCX order has been placed in her chart. Patient is aware.

## 2025-01-15 ENCOUNTER — ANESTHESIA EVENT (OUTPATIENT)
Dept: OPERATING ROOM | Facility: HOSPITAL | Age: 50
End: 2025-01-15
Payer: COMMERCIAL

## 2025-01-15 LAB — BACTERIA UR CULT: NORMAL

## 2025-01-16 ENCOUNTER — TELEPHONE (OUTPATIENT)
Dept: PREADMISSION TESTING | Facility: HOSPITAL | Age: 50
End: 2025-01-16

## 2025-01-20 ENCOUNTER — ANESTHESIA (OUTPATIENT)
Dept: OPERATING ROOM | Facility: HOSPITAL | Age: 50
End: 2025-01-20
Payer: COMMERCIAL

## 2025-01-20 ENCOUNTER — PHARMACY VISIT (OUTPATIENT)
Dept: PHARMACY | Facility: CLINIC | Age: 50
End: 2025-01-20
Payer: COMMERCIAL

## 2025-01-20 ENCOUNTER — APPOINTMENT (OUTPATIENT)
Dept: RADIOLOGY | Facility: HOSPITAL | Age: 50
End: 2025-01-20
Payer: COMMERCIAL

## 2025-01-20 ENCOUNTER — HOSPITAL ENCOUNTER (OUTPATIENT)
Facility: HOSPITAL | Age: 50
Setting detail: OUTPATIENT SURGERY
Discharge: HOME | End: 2025-01-20
Attending: STUDENT IN AN ORGANIZED HEALTH CARE EDUCATION/TRAINING PROGRAM | Admitting: STUDENT IN AN ORGANIZED HEALTH CARE EDUCATION/TRAINING PROGRAM
Payer: COMMERCIAL

## 2025-01-20 VITALS
WEIGHT: 186.95 LBS | OXYGEN SATURATION: 100 % | RESPIRATION RATE: 15 BRPM | HEART RATE: 53 BPM | SYSTOLIC BLOOD PRESSURE: 113 MMHG | BODY MASS INDEX: 28.33 KG/M2 | HEIGHT: 68 IN | DIASTOLIC BLOOD PRESSURE: 78 MMHG | TEMPERATURE: 96.8 F

## 2025-01-20 DIAGNOSIS — N20.0 KIDNEY STONE ON LEFT SIDE: Primary | ICD-10-CM

## 2025-01-20 DIAGNOSIS — N20.0 RENAL CALCULI: ICD-10-CM

## 2025-01-20 PROCEDURE — 2550000001 HC RX 255 CONTRASTS: Performed by: STUDENT IN AN ORGANIZED HEALTH CARE EDUCATION/TRAINING PROGRAM

## 2025-01-20 PROCEDURE — 52356 CYSTO/URETERO W/LITHOTRIPSY: CPT | Performed by: STUDENT IN AN ORGANIZED HEALTH CARE EDUCATION/TRAINING PROGRAM

## 2025-01-20 PROCEDURE — A52356 PR CYSTO/URETERO W/LITHOTRIPSY  AND INDWELL STENT INSRT: Performed by: NURSE ANESTHETIST, CERTIFIED REGISTERED

## 2025-01-20 PROCEDURE — 7100000009 HC PHASE TWO TIME - INITIAL BASE CHARGE: Performed by: STUDENT IN AN ORGANIZED HEALTH CARE EDUCATION/TRAINING PROGRAM

## 2025-01-20 PROCEDURE — 3700000002 HC GENERAL ANESTHESIA TIME - EACH INCREMENTAL 1 MINUTE: Performed by: STUDENT IN AN ORGANIZED HEALTH CARE EDUCATION/TRAINING PROGRAM

## 2025-01-20 PROCEDURE — 82365 CALCULUS SPECTROSCOPY: CPT | Performed by: STUDENT IN AN ORGANIZED HEALTH CARE EDUCATION/TRAINING PROGRAM

## 2025-01-20 PROCEDURE — RXMED WILLOW AMBULATORY MEDICATION CHARGE

## 2025-01-20 PROCEDURE — C1769 GUIDE WIRE: HCPCS | Performed by: STUDENT IN AN ORGANIZED HEALTH CARE EDUCATION/TRAINING PROGRAM

## 2025-01-20 PROCEDURE — 2500000004 HC RX 250 GENERAL PHARMACY W/ HCPCS (ALT 636 FOR OP/ED): Performed by: NURSE ANESTHETIST, CERTIFIED REGISTERED

## 2025-01-20 PROCEDURE — 74420 UROGRAPHY RTRGR +-KUB: CPT

## 2025-01-20 PROCEDURE — 2500000005 HC RX 250 GENERAL PHARMACY W/O HCPCS: Performed by: ANESTHESIOLOGY

## 2025-01-20 PROCEDURE — A52356 PR CYSTO/URETERO W/LITHOTRIPSY  AND INDWELL STENT INSRT: Performed by: ANESTHESIOLOGY

## 2025-01-20 PROCEDURE — C2617 STENT, NON-COR, TEM W/O DEL: HCPCS | Performed by: STUDENT IN AN ORGANIZED HEALTH CARE EDUCATION/TRAINING PROGRAM

## 2025-01-20 PROCEDURE — C1894 INTRO/SHEATH, NON-LASER: HCPCS | Performed by: STUDENT IN AN ORGANIZED HEALTH CARE EDUCATION/TRAINING PROGRAM

## 2025-01-20 PROCEDURE — 7100000010 HC PHASE TWO TIME - EACH INCREMENTAL 1 MINUTE: Performed by: STUDENT IN AN ORGANIZED HEALTH CARE EDUCATION/TRAINING PROGRAM

## 2025-01-20 PROCEDURE — 2500000002 HC RX 250 W HCPCS SELF ADMINISTERED DRUGS (ALT 637 FOR MEDICARE OP, ALT 636 FOR OP/ED): Performed by: NURSE ANESTHETIST, CERTIFIED REGISTERED

## 2025-01-20 PROCEDURE — 2500000004 HC RX 250 GENERAL PHARMACY W/ HCPCS (ALT 636 FOR OP/ED): Performed by: ANESTHESIOLOGY

## 2025-01-20 PROCEDURE — 3600000008 HC OR TIME - EACH INCREMENTAL 1 MINUTE - PROCEDURE LEVEL THREE: Performed by: STUDENT IN AN ORGANIZED HEALTH CARE EDUCATION/TRAINING PROGRAM

## 2025-01-20 PROCEDURE — 2500000004 HC RX 250 GENERAL PHARMACY W/ HCPCS (ALT 636 FOR OP/ED): Mod: JZ | Performed by: STUDENT IN AN ORGANIZED HEALTH CARE EDUCATION/TRAINING PROGRAM

## 2025-01-20 PROCEDURE — 2500000005 HC RX 250 GENERAL PHARMACY W/O HCPCS: Performed by: STUDENT IN AN ORGANIZED HEALTH CARE EDUCATION/TRAINING PROGRAM

## 2025-01-20 PROCEDURE — 3600000003 HC OR TIME - INITIAL BASE CHARGE - PROCEDURE LEVEL THREE: Performed by: STUDENT IN AN ORGANIZED HEALTH CARE EDUCATION/TRAINING PROGRAM

## 2025-01-20 PROCEDURE — 2780000003 HC OR 278 NO HCPCS: Performed by: STUDENT IN AN ORGANIZED HEALTH CARE EDUCATION/TRAINING PROGRAM

## 2025-01-20 PROCEDURE — 74420 UROGRAPHY RTRGR +-KUB: CPT | Performed by: STUDENT IN AN ORGANIZED HEALTH CARE EDUCATION/TRAINING PROGRAM

## 2025-01-20 PROCEDURE — 7100000001 HC RECOVERY ROOM TIME - INITIAL BASE CHARGE: Performed by: STUDENT IN AN ORGANIZED HEALTH CARE EDUCATION/TRAINING PROGRAM

## 2025-01-20 PROCEDURE — 7100000002 HC RECOVERY ROOM TIME - EACH INCREMENTAL 1 MINUTE: Performed by: STUDENT IN AN ORGANIZED HEALTH CARE EDUCATION/TRAINING PROGRAM

## 2025-01-20 PROCEDURE — 3700000001 HC GENERAL ANESTHESIA TIME - INITIAL BASE CHARGE: Performed by: STUDENT IN AN ORGANIZED HEALTH CARE EDUCATION/TRAINING PROGRAM

## 2025-01-20 PROCEDURE — 2720000007 HC OR 272 NO HCPCS: Performed by: STUDENT IN AN ORGANIZED HEALTH CARE EDUCATION/TRAINING PROGRAM

## 2025-01-20 DEVICE — STENT KIT, IMAJIN HYDRO, 6FR X 24CM, POSITIONER, NO GUIDEWIRE: Type: IMPLANTABLE DEVICE | Site: URETER | Status: FUNCTIONAL

## 2025-01-20 RX ORDER — FENTANYL CITRATE 50 UG/ML
INJECTION, SOLUTION INTRAMUSCULAR; INTRAVENOUS AS NEEDED
Status: DISCONTINUED | OUTPATIENT
Start: 2025-01-20 | End: 2025-01-20

## 2025-01-20 RX ORDER — DROPERIDOL 2.5 MG/ML
0.62 INJECTION, SOLUTION INTRAMUSCULAR; INTRAVENOUS ONCE AS NEEDED
Status: DISCONTINUED | OUTPATIENT
Start: 2025-01-20 | End: 2025-01-20 | Stop reason: HOSPADM

## 2025-01-20 RX ORDER — LIDOCAINE HCL/PF 100 MG/5ML
SYRINGE (ML) INTRAVENOUS AS NEEDED
Status: DISCONTINUED | OUTPATIENT
Start: 2025-01-20 | End: 2025-01-20

## 2025-01-20 RX ORDER — OXYBUTYNIN CHLORIDE 5 MG/1
5 TABLET ORAL 3 TIMES DAILY PRN
Qty: 10 TABLET | Refills: 0 | Status: SHIPPED | OUTPATIENT
Start: 2025-01-20 | End: 2025-01-27

## 2025-01-20 RX ORDER — PHENAZOPYRIDINE HYDROCHLORIDE 200 MG/1
200 TABLET, FILM COATED ORAL 3 TIMES DAILY PRN
Qty: 10 TABLET | Refills: 0 | Status: SHIPPED | OUTPATIENT
Start: 2025-01-20 | End: 2025-01-27

## 2025-01-20 RX ORDER — PROPOFOL 10 MG/ML
INJECTION, EMULSION INTRAVENOUS AS NEEDED
Status: DISCONTINUED | OUTPATIENT
Start: 2025-01-20 | End: 2025-01-20

## 2025-01-20 RX ORDER — HYDROMORPHONE HYDROCHLORIDE 2 MG/ML
INJECTION, SOLUTION INTRAMUSCULAR; INTRAVENOUS; SUBCUTANEOUS AS NEEDED
Status: DISCONTINUED | OUTPATIENT
Start: 2025-01-20 | End: 2025-01-20

## 2025-01-20 RX ORDER — DIPHENHYDRAMINE HYDROCHLORIDE 50 MG/ML
12.5 INJECTION INTRAMUSCULAR; INTRAVENOUS ONCE AS NEEDED
Status: DISCONTINUED | OUTPATIENT
Start: 2025-01-20 | End: 2025-01-20 | Stop reason: HOSPADM

## 2025-01-20 RX ORDER — MEPERIDINE HYDROCHLORIDE 25 MG/ML
12.5 INJECTION INTRAMUSCULAR; INTRAVENOUS; SUBCUTANEOUS EVERY 10 MIN PRN
Status: DISCONTINUED | OUTPATIENT
Start: 2025-01-20 | End: 2025-01-20 | Stop reason: HOSPADM

## 2025-01-20 RX ORDER — SODIUM CHLORIDE, SODIUM LACTATE, POTASSIUM CHLORIDE, CALCIUM CHLORIDE 600; 310; 30; 20 MG/100ML; MG/100ML; MG/100ML; MG/100ML
20 INJECTION, SOLUTION INTRAVENOUS CONTINUOUS
Status: DISCONTINUED | OUTPATIENT
Start: 2025-01-20 | End: 2025-01-20 | Stop reason: HOSPADM

## 2025-01-20 RX ORDER — ONDANSETRON HYDROCHLORIDE 2 MG/ML
4 INJECTION, SOLUTION INTRAVENOUS ONCE AS NEEDED
Status: DISCONTINUED | OUTPATIENT
Start: 2025-01-20 | End: 2025-01-20 | Stop reason: HOSPADM

## 2025-01-20 RX ORDER — ONDANSETRON HYDROCHLORIDE 2 MG/ML
INJECTION, SOLUTION INTRAVENOUS AS NEEDED
Status: DISCONTINUED | OUTPATIENT
Start: 2025-01-20 | End: 2025-01-20

## 2025-01-20 RX ORDER — OXYCODONE HYDROCHLORIDE 5 MG/1
5 TABLET ORAL EVERY 4 HOURS PRN
Status: DISCONTINUED | OUTPATIENT
Start: 2025-01-20 | End: 2025-01-20 | Stop reason: HOSPADM

## 2025-01-20 RX ORDER — OXYBUTYNIN CHLORIDE 5 MG/1
5 TABLET ORAL 3 TIMES DAILY PRN
Qty: 10 TABLET | Refills: 0 | Status: SHIPPED | OUTPATIENT
Start: 2025-01-20 | End: 2025-01-25

## 2025-01-20 RX ORDER — SODIUM CHLORIDE, SODIUM LACTATE, POTASSIUM CHLORIDE, CALCIUM CHLORIDE 600; 310; 30; 20 MG/100ML; MG/100ML; MG/100ML; MG/100ML
100 INJECTION, SOLUTION INTRAVENOUS CONTINUOUS
Status: DISCONTINUED | OUTPATIENT
Start: 2025-01-20 | End: 2025-01-20 | Stop reason: HOSPADM

## 2025-01-20 RX ORDER — SODIUM CHLORIDE 0.9 G/100ML
INJECTION, SOLUTION IRRIGATION AS NEEDED
Status: DISCONTINUED | OUTPATIENT
Start: 2025-01-20 | End: 2025-01-20 | Stop reason: HOSPADM

## 2025-01-20 RX ORDER — OXYCODONE HYDROCHLORIDE 5 MG/1
5 TABLET ORAL EVERY 6 HOURS PRN
Qty: 12 TABLET | Refills: 0 | Status: SHIPPED | OUTPATIENT
Start: 2025-01-20 | End: 2025-01-23

## 2025-01-20 RX ORDER — ALBUTEROL SULFATE 0.83 MG/ML
2.5 SOLUTION RESPIRATORY (INHALATION) ONCE AS NEEDED
Status: DISCONTINUED | OUTPATIENT
Start: 2025-01-20 | End: 2025-01-20 | Stop reason: HOSPADM

## 2025-01-20 RX ORDER — APREPITANT 40 MG/1
CAPSULE ORAL AS NEEDED
Status: DISCONTINUED | OUTPATIENT
Start: 2025-01-20 | End: 2025-01-20

## 2025-01-20 RX ORDER — MIDAZOLAM HYDROCHLORIDE 1 MG/ML
INJECTION INTRAMUSCULAR; INTRAVENOUS AS NEEDED
Status: DISCONTINUED | OUTPATIENT
Start: 2025-01-20 | End: 2025-01-20

## 2025-01-20 RX ORDER — CEFAZOLIN SODIUM 2 G/100ML
2 INJECTION, SOLUTION INTRAVENOUS ONCE
Status: COMPLETED | OUTPATIENT
Start: 2025-01-20 | End: 2025-01-20

## 2025-01-20 RX ADMIN — CEFAZOLIN SODIUM 2 G: 2 INJECTION, SOLUTION INTRAVENOUS at 08:03

## 2025-01-20 RX ADMIN — FENTANYL CITRATE 25 MCG: 50 INJECTION, SOLUTION INTRAMUSCULAR; INTRAVENOUS at 08:00

## 2025-01-20 RX ADMIN — PROPOFOL 160 MG: 10 INJECTION, EMULSION INTRAVENOUS at 08:00

## 2025-01-20 RX ADMIN — DEXAMETHASONE SODIUM PHOSPHATE 8 MG: 4 INJECTION INTRA-ARTICULAR; INTRALESIONAL; INTRAMUSCULAR; INTRAVENOUS; SOFT TISSUE at 08:00

## 2025-01-20 RX ADMIN — LIDOCAINE HYDROCHLORIDE 60 MG: 20 INJECTION INTRAVENOUS at 08:00

## 2025-01-20 RX ADMIN — MIDAZOLAM HYDROCHLORIDE 2 MG: 1 INJECTION, SOLUTION INTRAMUSCULAR; INTRAVENOUS at 07:53

## 2025-01-20 RX ADMIN — SODIUM CHLORIDE, POTASSIUM CHLORIDE, SODIUM LACTATE AND CALCIUM CHLORIDE 20 ML/HR: 600; 310; 30; 20 INJECTION, SOLUTION INTRAVENOUS at 06:42

## 2025-01-20 RX ADMIN — FENTANYL CITRATE 25 MCG: 50 INJECTION, SOLUTION INTRAMUSCULAR; INTRAVENOUS at 08:02

## 2025-01-20 RX ADMIN — APREPITANT 40 MG: 40 CAPSULE ORAL at 07:25

## 2025-01-20 RX ADMIN — HYDROMORPHONE HYDROCHLORIDE 0.5 MG: 2 INJECTION, SOLUTION INTRAMUSCULAR; INTRAVENOUS; SUBCUTANEOUS at 08:14

## 2025-01-20 RX ADMIN — ONDANSETRON 4 MG: 2 INJECTION, SOLUTION INTRAMUSCULAR; INTRAVENOUS at 08:34

## 2025-01-20 RX ADMIN — Medication 6 L/MIN: at 08:47

## 2025-01-20 RX ADMIN — FENTANYL CITRATE 50 MCG: 50 INJECTION, SOLUTION INTRAMUSCULAR; INTRAVENOUS at 08:04

## 2025-01-20 SDOH — HEALTH STABILITY: MENTAL HEALTH: CURRENT SMOKER: 0

## 2025-01-20 ASSESSMENT — PAIN - FUNCTIONAL ASSESSMENT: PAIN_FUNCTIONAL_ASSESSMENT: 0-10

## 2025-01-20 ASSESSMENT — COLUMBIA-SUICIDE SEVERITY RATING SCALE - C-SSRS
6. HAVE YOU EVER DONE ANYTHING, STARTED TO DO ANYTHING, OR PREPARED TO DO ANYTHING TO END YOUR LIFE?: NO
1. IN THE PAST MONTH, HAVE YOU WISHED YOU WERE DEAD OR WISHED YOU COULD GO TO SLEEP AND NOT WAKE UP?: NO
2. HAVE YOU ACTUALLY HAD ANY THOUGHTS OF KILLING YOURSELF?: NO

## 2025-01-20 ASSESSMENT — PAIN SCALES - GENERAL
PAINLEVEL_OUTOF10: 0 - NO PAIN

## 2025-01-20 ASSESSMENT — ENCOUNTER SYMPTOMS
RESPIRATORY NEGATIVE: 1
CONSTITUTIONAL NEGATIVE: 1

## 2025-01-20 NOTE — OP NOTE
URETEROSCOPY, WITH LASER LITHOTRIPSY, DOUBLE J STENT EXCHANGE, RETROGRADE PYELOGRAM (L) Operative Note     Date: 2025  OR Location: [facility]    Name: Ange Mcdonald : 1975, Age: 49 y.o., MRN: [id number], Sex: female    Diagnosis  Pre-op Diagnosis      * Kidney stone on left side [N20.0] Post-op Diagnosis     * Kidney stone on left side [N20.0]     Procedures  URETEROSCOPY, WITH LASER LITHOTRIPSY, DOUBLE J STENT EXCHANGE, RETROGRADE PYELOGRAM  89863 - [place] CYSTO/URETERO W/LITHOTRIPSY &INDWELL STENT INSRT    CHG UROGRAPHY RETROGRADE WITH/WO KUB [22933]  Surgeons      * Tanya Martinez - Primary    Resident/Fellow/Other Assistant:  Surgeons and Role:  * No surgeons found with a matching role *    Staff:   Circulator: Luz  Scrub Person: Rufino Ch Circulator: Haja Ch Scrub: Erin    Anesthesia Staff: Anesthesiologist: Darrick Sorto MD  CRNA: KRISHNA Aguiar-IGOR    Procedure Summary  Anesthesia: General  ASA: II  Estimated Blood Loss: 1 mL  Intra-op Medications: * Intraprocedure medication information is unavailable because the case start and end events have not been set *           Anesthesia Record               Intraprocedure I/O Totals       None           Specimen:   ID Type Source Tests Collected by Time   A : RENAL CALCULI Calculus Ureter, Left CALCULI (STONE) ANALYSIS Tanya Martinez MD 2025 0836                 Drains and/or Catheters:   Ureteral Drain/Stent Left ureter 6 Fr. (Active)       Tourniquet Times:         Implants:  Implants       Type Name Action Serial No.      Implant STENT KIT, IMAJIN HYDRO, 6FR X 24CM, POSITIONER, NO GUIDEWIRE - [id number] Implanted               Findings: stone fragments in left kidney ranging in size from 3-6 mm    Indications: Ange Mcdonald is an 49 y.o. female who is having surgery for Kidney stone on left side [N20.0]. She underwent a first stage ureteroscopy 3 weeks ago, presenting for second stage procedure.    The patient was  seen in the preoperative area. The risks, benefits, complications, treatment options, non-operative alternatives, expected recovery and outcomes were discussed with the patient. The possibilities of reaction to medication, pulmonary aspiration, injury to surrounding structures, bleeding, recurrent infection, the need for additional procedures, failure to diagnose a condition, and creating a complication requiring transfusion or operation were discussed with the patient. The patient concurred with the proposed plan, giving informed consent.  The site of surgery was properly noted/marked if necessary per policy. The patient has been actively warmed in preoperative area. Preoperative antibiotics have been ordered and given within 1 hours of incision. Venous thrombosis prophylaxis have been ordered including bilateral sequential compression devices    Procedure Details: Patient was consented and antibiotics were started on call to OR.  In the operating room, under general anesthesia, with the patient in dorsolithotomy position, genitalia was prepped and draped in the usual manner.  #22 cystoscope was inserted down the urethra into the bladder, and cystoscopy revealed no stones or tumors. The ureteral orifices were identified in the normal orthotopic position with a stent arising from the left ureteral stent that was pulled out of the ureter.  The ureteral catheter was advanced through the cystoscope and a guidewire was inserted through the left ureteral orifice.  The cystoscope was removed and a rigid ureteroscope was advanced showing no stone fragments in the ureter. The rigid ureteroscope was removed then a 12-14 x 36 cm ureteral access sheath was advanced over the wire until it was positioned near the ureteropelvic junction.  The inner sheath was removed.  The flexible ureteroscope was inserted through and pyeloscopy was performed revealing the stone fragments ranging from 3-6 mm in the middle and lower calyces.   Using the 200 micron laser fiber, the stones were fragmented into smaller fragments.  We continued working on the stone until there were no large fragments, and some of the stone fragments were extracted using the basket.    Contrast was injected through the ureteroscope, showing fullness of the collecting system, and no filling defects.    Flexible ureteroscope and the ureteral access sheath were extracted under vision.  The cystoscope was reinserted over the wire.  A 6-24 double-J stent was inserted over the wire and position was adjusted by the pusher.  The proximal curl was seen by fluoroscopy to be in the topography of the kidney, while the distal curl was visualized directly in the bladder.    The bladder was emptied.  This concluded the procedure.  Patient tolerated the procedure well, was awakened and transferred to PACU in stable condition    Complications:  None; patient tolerated the procedure well.    Disposition: PACU - hemodynamically stable.  Condition: stable       Additional Details:     Attending Attestation: I performed the procedure.    Tanya Martinez  Phone Number: [phone]

## 2025-01-20 NOTE — ANESTHESIA PROCEDURE NOTES
Airway  Date/Time: 1/20/2025 8:01 AM  Urgency: elective    Airway not difficult    Staffing  Performed: CRNA   Authorized by: Darrick Sorto MD    Performed by: KRISHNA Aguiar-IGOR  Patient location during procedure: OR    Indications and Patient Condition  Indications for airway management: anesthesia  Sedation level: deep  Preoxygenated: yes  Patient position: sniffing  Mask difficulty assessment: 0 - not attempted  Planned trial extubation    Final Airway Details  Final airway type: supraglottic airway      Successful airway: Supraglottic airway: i-gel.  Size 4     Number of attempts at approach: 1

## 2025-01-20 NOTE — ANESTHESIA PREPROCEDURE EVALUATION
Patient: Ange Mcdonald    Procedure Information       Date/Time: 01/20/25 0715    Procedure: CYSTOSCOPY, WITH LASER LITHOTRIPSY (Left)    Location: [facility] / [facility]    Surgeons: Tanya Martinez MD          Vitals:    01/20/25 0626   BP: 129/78   Pulse: 70   Resp: 16   Temp: 36.7 °C (98.1 °F)   SpO2: 100%       Past Surgical History:   Procedure Laterality Date    COLONOSCOPY  07/15/2024    CYSTOSCOPY W/ LASER LITHOTRIPSY  12/27/2024    with left stent placement    HERNIA REPAIR      Inguinal -child    MANDIBLE SURGERY      Reconstruction Jaw     Past Medical History:   Diagnosis Date    Kidney stone on left side     PONV (postoperative nausea and vomiting)        Current Facility-Administered Medications:     ceFAZolin (Ancef) 2 g in dextrose (iso)  mL, 2 g, intravenous, Once, Tanya Martinez MD    lactated Ringer's infusion, 20 mL/hr, intravenous, Continuous, Darrick Sorto MD, Last Rate: 20 mL/hr at 01/20/25 0642, 20 mL/hr at 01/20/25 0642  Prior to Admission medications    Medication Sig Start Date End Date Taking? Authorizing Provider   b complex 0.4 mg tablet Take 1 tablet by mouth once daily.   Yes Historical Provider, MD   cholecalciferol (Vitamin D3) 25 MCG (1000 UT) capsule Take 1 capsule (25 mcg) by mouth once daily.   Yes Historical Provider, MD   glucosamine/chondr rocha A sod (OSTEO BI-FLEX ORAL) Take 1 tablet by mouth early in the morning..   Yes Historical Provider, MD   norethindrone ac-eth estradioL (Femhrt 1/5) 1-5 mg-mcg tablet Take 1 tablet by mouth once every 24 hours. 4/22/24 4/22/25 Yes Delma Zamarripa,    oxybutynin (Ditropan) 5 mg tablet Take 1 tablet (5 mg) by mouth 3 times a day. 12/27/24  Yes Siri Rivas PA-C   tamsulosin (Flomax) 0.4 mg 24 hr capsule Take 1 capsule (0.4 mg) by mouth once daily. 12/27/24  Yes Siri Rivas PA-C   oxyCODONE (Roxicodone) 5 mg immediate release tablet Take 1 tablet (5 mg) by mouth every 6 hours if needed for severe pain (7  "- 10). 12/27/24   Siri Rivas PA-C   phenazopyridine (Pyridium) 200 mg tablet Take 1 tablet (200 mg) by mouth 3 times a day as needed for bladder spasms. 12/27/24   Siri Rivas PA-C     No Known Allergies  Social History     Tobacco Use    Smoking status: Never    Smokeless tobacco: Never   Substance Use Topics    Alcohol use: Yes     Comment: 1 x month         Chemistry    Lab Results   Component Value Date/Time     11/25/2024 1601    K 4.5 11/25/2024 1601     11/25/2024 1601    CO2 26 11/25/2024 1601    BUN 14 11/25/2024 1601    CREATININE 0.73 11/25/2024 1601    Lab Results   Component Value Date/Time    CALCIUM 9.0 11/25/2024 1601    ALKPHOS 57 11/25/2024 1601    AST 11 11/25/2024 1601    ALT 7 11/25/2024 1601    BILITOT 0.4 11/25/2024 1601          Lab Results   Component Value Date/Time    WBC 8.7 11/25/2024 1601    HGB 13.2 11/25/2024 1601    HCT 41.2 11/25/2024 1601     11/25/2024 1601     No results found for: \"PROTIME\", \"PTT\", \"INR\"  Encounter Date: 12/27/24   ECG 12 Lead   Result Value    Ventricular Rate 57    Atrial Rate 57    AR Interval 128    QRS Duration 92    QT Interval 408    QTC Calculation(Bazett) 397    P Axis 73    R Axis 72    T Axis 65    QRS Count 9    Q Onset 223    P Onset 159    P Offset 210    T Offset 427    QTC Fredericia 401    Narrative    Sinus bradycardia  Otherwise normal ECG  No previous ECGs available     No results found for this or any previous visit from the past 1095 days.      Relevant Problems   Anesthesia   (+) PONV (postoperative nausea and vomiting)      /Renal   (+) Kidney stone on left side   (+) Renal calculi       Clinical information reviewed:    Allergies  Meds    Surg Hx  OB Status           NPO Detail:  NPO/Void Status  Date of Last Solid: 01/19/25  Time of Last Solid: 1900  Last Intake Type: Food         Physical Exam    Airway  Mallampati: II     Cardiovascular - normal exam     Dental    Pulmonary    Abdominal  "           Anesthesia Plan    History of general anesthesia?: yes  History of complications of general anesthesia?: no    ASA 2     general     The patient is not a current smoker.  Patient was not previously instructed to abstain from smoking on day of procedure.  Patient did not smoke on day of procedure.  Education provided regarding risk of obstructive sleep apnea.  intravenous induction   Anesthetic plan and risks discussed with patient.    Plan discussed with CRNA.

## 2025-01-20 NOTE — H&P
"History Of Present Illness  Ange Mcdonald is a 49 y.o. female presenting with large left renal stone, underwent a ureteroscopy several weeks as first stage, planned for second stage today.     Past Medical History  Past Medical History:   Diagnosis Date    Kidney stone on left side     PONV (postoperative nausea and vomiting)        Surgical History  Past Surgical History:   Procedure Laterality Date    COLONOSCOPY  07/15/2024    CYSTOSCOPY W/ LASER LITHOTRIPSY  12/27/2024    with left stent placement    HERNIA REPAIR      Inguinal -child    MANDIBLE SURGERY      Reconstruction Jaw        Social History  She reports that she has never smoked. She has never used smokeless tobacco. She reports current alcohol use. She reports that she does not use drugs.    Family History  Family History   Problem Relation Name Age of Onset    Diabetes type II Father      Ovarian cancer Father's Sister  50 - 59    Breast cancer Paternal Grandmother  40 - 49        Allergies  Patient has no known allergies.    Review of Systems   Constitutional: Negative.    Respiratory: Negative.     Genitourinary: Negative.         Physical Exam  Constitutional:       Appearance: Normal appearance. She is normal weight.   HENT:      Head: Normocephalic and atraumatic.   Cardiovascular:      Rate and Rhythm: Normal rate.      Pulses: Normal pulses.   Pulmonary:      Effort: Pulmonary effort is normal.   Abdominal:      General: Abdomen is flat.      Palpations: Abdomen is soft.   Neurological:      Mental Status: She is alert.          Last Recorded Vitals  Blood pressure 129/78, pulse 70, temperature 36.7 °C (98.1 °F), resp. rate 16, height 1.727 m (5' 8\"), weight 84.8 kg (186 lb 15.2 oz), SpO2 100%.    Relevant Results  ECG 12 Lead    Result Date: 12/31/2024  Sinus bradycardia Otherwise normal ECG No previous ECGs available    FL less than 1 hour    Result Date: 12/27/2024  These images are not reportable by radiology and will not be interpreted " by  Radiologists.        Assessment/Plan   Assessment & Plan  Kidney stone on left side    48 yo female with large left renal pelvis stone, s/p first stage ureteroscopy, today planned for second stage ureteroscopy and stent exchange.      Tanya Martinez MD

## 2025-01-20 NOTE — ANESTHESIA POSTPROCEDURE EVALUATION
Patient: Ange Mcdonald    Procedure Summary       Date: 01/20/25 Room / Location: [facility] / [facility]    Anesthesia Start: 0753 Anesthesia Stop: 0848    Procedure: URETEROSCOPY, WITH LASER LITHOTRIPSY, DOUBLE J STENT EXCHANGE, RETROGRADE PYELOGRAM (Left) Diagnosis:       Kidney stone on left side      (Kidney stone on left side [N20.0])    Surgeons: Tanya Martinez MD Responsible Provider: Darrick Sorto MD    Anesthesia Type: general ASA Status: 2            Anesthesia Type: general    Vitals Value Taken Time   /84 01/20/25 0902   Temp 36 °C (96.8 °F) 01/20/25 0847   Pulse 55 01/20/25 0902   Resp 13 01/20/25 0902   SpO2 99 % 01/20/25 0902       Anesthesia Post Evaluation    Patient location during evaluation: PACU  Patient participation: complete - patient participated  Level of consciousness: awake  Pain management: adequate  Multimodal analgesia pain management approach  Airway patency: patent  Two or more strategies used to mitigate risk of obstructive sleep apnea  Cardiovascular status: acceptable  Respiratory status: acceptable  Hydration status: acceptable  Postoperative Nausea and Vomiting: none        No notable events documented.

## 2025-01-20 NOTE — DISCHARGE INSTRUCTIONS
DEPARTMENT OF UROLOGY  DISCHARGE INSTRUCTIONS ES  Outpatient Surgery    C O N F I D E N T I A L   I N F O R M A T I O N    Call [phone] during regular daytime business hours (8:00 am - 5:00 pm) and after 5:00 pm ask for the Urology resident with any questions or concerns.    If it is a life-threatening situation, proceed to the nearest emergency department.        Follow-up appointment:  Follow up with Dr. Martinez as scheduled or call office for appointment  Thank you for the opportunity to care for you today.  Your health and healing are very important to us.  We hope we made you feel as comfortable as possible and are committed to your recovery and continued well-being.      The following is a brief overview of your procedure today. Some of the information contained on this summary may be confidential.  This information should be kept in your records and should be shared with your regular doctor.    Physicians:   Dr. Martinez    Procedure performed: Extracorporeal Shockwave Lithotripsy    What to Expect During your Recovery and Home Care  Anesthesia Side Effects   You received anesthesia today.  You may feel sleepy, tired, or have a sore throat.   You may also feel drowsiness, dizziness, or inability to think clearly.  For your safety, do not drive, drink alcoholic beverages, take any unprescribed medication or make any important decisions for 24 hours.  A responsible adult should be with you for 24 hours.        Activity and Recovery    Rest and take it easy today, you may return to your normal activities tomorrow. Expect to be sore for 1-2 weeks.     Pain Control  Unfortunately, you may experience pain after your procedure.  Adequate management can include alternative measures to help ease your pain and can include heating pad to your lower back, over the counter Tylenol. Do not take more than 4,000 mg of Tylenol in 24 hours. You may have also been prescribed tramadol and pyridium for pain control if you  had a stent placed today. If prescribed pyridium this will turn your urine bright orange.     Nausea/Vomiting   Clear liquids are best tolerated at first. Start slow, advance your diet as tolerated to normal foods. Avoid spicy, greasy, heavy foods at first. Also, you may feel nauseous or like you need to vomit if you take any type of medication on an empty stomach.  Call your physician if you are unable to eat or drink and have persistent vomiting.          Treatment/wound care:   You may observe redness over the area of your skin that the shockwave machine was used. This will diminish over the next 12-24 hours.   It is okay to shower 24 hours after time of surgery.     Signs of Infection:  Fever of 100.4 F(38C) or higher, chills, blood in your urine and pain with passing urine    Additional Instructions:   Pieces of your kidney stone may pass in the urine for a few days and may cause some mild pain. Drink 8-10 glasses of water each day. This will help flush out the broken kidney stones. You may be asked to strain our urine using a filter, to collect stone pieces that could be used for testing.    You may have had a stent placed today from your kidney down into your bladder. This helps keep the urinary tract open and prevents blockages of urine flow. The stent can be irritating and can cause some frequency, urgency and blood in your urine when you go to the bathroom. It is important to drink plenty of water and take medications as prescribed. You may be sent home with pyridium which turns your urine bright orange. Applying a heating pad to your back will also help with this kind of pain.

## 2025-01-23 LAB
APPEARANCE STONE: NORMAL
COMPN STONE: NORMAL
SPECIMEN WT: 167 MG

## 2025-02-01 NOTE — PROGRESS NOTES
Patient ID: Ange Mcdonald is a 49 y.o. female.     Procedures  PROCEDURE NOTE:     PREOPERATIVE DIAGNOSIS:  left ureteral stone     POSTOPERATIVE DIAGNOSIS:  Same     OPERATION:  Flexible Cystourethroscopy  Removal of left double J stent     SURGEON:  Tanya Martinez MD     ANESTHESIA:  2%  lidocaine jelly     COMPLICATIONS:  None     EBL: Minimal     SPECIMEN:  Voided urine was not collected and submitted for culture.     DISPOSITION:  The patient was discharged home after the procedure, per routine.     INDICATIONS: :  Patient with a history of left ureteral stone who underwent left ureteroscopy and laser stone fragmentation with stent placement,  who presents today for Cystoscopy and stent removal.      The indications, risks and benefits of this procedure were discussed with the patient, consent was obtained prior to the procedure, and to the best of my judgement the patient seemed to understand and agree to the procedure.     PROCEDURE:  The patient  was brought into the procedure suite and informed consent was reviewed and confirmed. Vital signs were obtained prior to the procedure: There were no vitals taken for this visit.  The patient was escorted onto the stretcher, placed supine, prepped with betadine and draped in the usual standard surgical fashion.  Intraurethral 2% viscous lidocaine jelly was used for local analgesia.  A 16 [place] flexible cystourethroscope was inserted into the urethra.   The urethra was normal.  Upon entering the bladder the entire bladder was surveyed in a 360 degree fashion.  The left and right ureteral orifices were in normal orthotopic position with the stent protruding out of the left ureteral orifice.   Using the stent grasper, the left double J stent was grasped and pulled out of the urethra.   The patient tolerated the procedure well.  Vitals were stable after the procedure.  The patient was able to void and was discharged home.  Verbal and written Post procedure  instructions were reviewed with the patient.     IMPRESSION:  Left ureteral stent removed smoothly     PLAN:  Encourage hydration  KUB + US kidneys in 3 months  Refer to Dr. Oscar

## 2025-02-03 ENCOUNTER — APPOINTMENT (OUTPATIENT)
Dept: UROLOGY | Facility: CLINIC | Age: 50
End: 2025-02-03
Payer: COMMERCIAL

## 2025-02-03 DIAGNOSIS — N20.0 KIDNEY STONE: ICD-10-CM

## 2025-02-03 LAB
POC APPEARANCE, URINE: CLEAR
POC BILIRUBIN, URINE: NEGATIVE
POC BLOOD, URINE: ABNORMAL
POC COLOR, URINE: YELLOW
POC GLUCOSE, URINE: NEGATIVE MG/DL
POC KETONES, URINE: NEGATIVE MG/DL
POC LEUKOCYTES, URINE: NEGATIVE
POC NITRITE,URINE: NEGATIVE
POC PH, URINE: 5.5 PH
POC PROTEIN, URINE: ABNORMAL MG/DL
POC SPECIFIC GRAVITY, URINE: >=1.03
POC UROBILINOGEN, URINE: 0.2 EU/DL

## 2025-02-03 PROCEDURE — 52310 CYSTOSCOPY AND TREATMENT: CPT | Performed by: STUDENT IN AN ORGANIZED HEALTH CARE EDUCATION/TRAINING PROGRAM

## 2025-02-03 NOTE — PROGRESS NOTES
Subjective   Patient ID: Ange Mcdonald is a 49 y.o. female.      HPI  49 y.o. female presents status post ureteroscopy with stone extraction and retrograde pyelogram on 01/20/2025. She returns for stent removal today.     Calculi Analysis:    Comment: Calculi composed primarily of:  40% calcium oxalate dihydrate, and  60% calcium phosphate (hydroxy- and carbonate- apatite).  INTERPRETIVE INFORMATION: Calculi (Stone) analysis         Review of Systems  A complete review of systems was performed. All systems are noted to be negative unless indicated in the history of present illness, impression, active problem list, or past histories.     Objective   Physical Exam    Assessment/Plan   Diagnoses and all orders for this visit:  Kidney stone  -     US renal complete; Future  -     XR abdomen 1 view; Future  -     Referral to Nephrology; Future      49 y.o. female presents status post ureteroscopy with stone extraction and retrograde pyelogram on 01/20/2025. She returns for stent removal today.     Due to the patient's large stone and mixed calcium oxidate and calcium phosphate, I would like her to follow up with a nephrologist for a work up.  I will refer her to a nephrologist Dr. Obando. She will also undergo a KUB and ultrasound of the kidneys.     I personally reviewed the medical records of the patient including the lab values, the note of the referring physician and I reviewed the report and visualized the images performed focusing on the calculi analysis from 01/20/2025.    Plan:  KUB   Ultrasound of kidneys in 3 months  Refer to Dr. Obando for renal stone medical work-up  FUV 3 months      Scribe Attestation   By signing my name below, I, Jason Jimenez, Scribe attestation that this documentation has been prepared under the direction and in the presence of Tanya Martinez MD.

## 2025-02-24 ENCOUNTER — OFFICE VISIT (OUTPATIENT)
Dept: OBSTETRICS AND GYNECOLOGY | Facility: CLINIC | Age: 50
End: 2025-02-24
Payer: COMMERCIAL

## 2025-02-24 VITALS
DIASTOLIC BLOOD PRESSURE: 73 MMHG | SYSTOLIC BLOOD PRESSURE: 100 MMHG | HEIGHT: 68 IN | WEIGHT: 188.9 LBS | BODY MASS INDEX: 28.63 KG/M2

## 2025-02-24 DIAGNOSIS — Q52.129: ICD-10-CM

## 2025-02-24 DIAGNOSIS — N95.1 SYMPTOMATIC MENOPAUSAL OR FEMALE CLIMACTERIC STATES: Primary | ICD-10-CM

## 2025-02-24 PROCEDURE — 99213 OFFICE O/P EST LOW 20 MIN: CPT | Performed by: OBSTETRICS & GYNECOLOGY

## 2025-02-24 PROCEDURE — 3008F BODY MASS INDEX DOCD: CPT | Performed by: OBSTETRICS & GYNECOLOGY

## 2025-02-24 PROCEDURE — 1036F TOBACCO NON-USER: CPT | Performed by: OBSTETRICS & GYNECOLOGY

## 2025-02-24 RX ORDER — NORETHINDRONE ACETATE AND ETHINYL ESTRADIOL 1; 5 MG/1; UG/1
1 TABLET ORAL EVERY 24 HOURS
Qty: 90 TABLET | Refills: 3 | Status: SHIPPED | OUTPATIENT
Start: 2025-02-24 | End: 2026-02-24

## 2025-02-24 ASSESSMENT — PAIN SCALES - GENERAL: PAINLEVEL_OUTOF10: 0-NO PAIN

## 2025-02-24 ASSESSMENT — ENCOUNTER SYMPTOMS
LOSS OF SENSATION IN FEET: 0
DEPRESSION: 0
OCCASIONAL FEELINGS OF UNSTEADINESS: 0

## 2025-02-24 NOTE — PROGRESS NOTES
GYN OFFICE VISIT    Patient Name:  Ange Mcdonald  :  1975  MR #:  [id number]  Acct #:  [id number]      ASSESSMENT/PLAN:     There are no diagnoses linked to this encounter.     There are no diagnoses linked to this encounter.    No problem-specific Assessment & Plan notes found for this encounter.        .{plan; gyn:35604}    No follow-ups on file.      Subjective    Chief Complaint   Patient presents with    Mass       Ange Mcdonald is a 49 y.o.  No LMP recorded (lmp unknown). Patient is premenopausal.   female who presents for ***      Past Medical History:   Diagnosis Date    Kidney stone on left side     PONV (postoperative nausea and vomiting)        Past Surgical History:   Procedure Laterality Date    COLONOSCOPY  07/15/2024    CYSTOSCOPY W/ LASER LITHOTRIPSY  2024    with left stent placement    HERNIA REPAIR      Inguinal -child    MANDIBLE SURGERY      Reconstruction Jaw       Social History     Socioeconomic History    Marital status:      Spouse name: Not on file    Number of children: Not on file    Years of education: Not on file    Highest education level: Not on file   Occupational History    Not on file   Tobacco Use    Smoking status: Never    Smokeless tobacco: Never   Vaping Use    Vaping status: Never Used   Substance and Sexual Activity    Alcohol use: Yes     Comment: 1 x month    Drug use: Never    Sexual activity: Yes     Partners: Male   Other Topics Concern    Not on file   Social History Narrative    Not on file     Social Drivers of Health     Financial Resource Strain: Not on file   Food Insecurity: Not on file   Transportation Needs: Not on file   Physical Activity: Not on file   Stress: Not on file   Social Connections: Not on file   Intimate Partner Violence: Not on file   Housing Stability: Not on file       Family History   Problem Relation Name Age of Onset    Diabetes type II Father      Ovarian cancer Father's Sister  50 - 59    Breast cancer  "Paternal Grandmother  40 - 49       Prior to Admission medications    Medication Sig Start Date End Date Taking? Authorizing Provider   b complex 0.4 mg tablet Take 1 tablet by mouth once daily.    Historical Provider, MD   cholecalciferol (Vitamin D3) 25 MCG (1000 UT) capsule Take 1 capsule (25 mcg) by mouth once daily.    Historical Provider, MD   glucosamine/chondr rocha A sod (OSTEO BI-FLEX ORAL) Take 1 tablet by mouth early in the morning..    Historical Provider, MD   norethindrone ac-eth estradioL (Femhrt 1/5) 1-5 mg-mcg tablet Take 1 tablet by mouth once every 24 hours. 4/22/24 4/22/25  Delma Zamarripa, DO       No Known Allergies           OBJECTIVE:   /73   Ht 1.727 m (5' 8\")   Wt 85.7 kg (188 lb 14.4 oz)   LMP  (LMP Unknown)   BMI 28.72 kg/m²   Body mass index is 28.72 kg/m².     Physical Exam  Vitals and nursing note reviewed.   Constitutional:       General: She is not in acute distress.  Cardiovascular:      Rate and Rhythm: Normal rate and regular rhythm.      Heart sounds: No murmur heard.     No friction rub. No gallop.   Pulmonary:      Effort: Pulmonary effort is normal.      Breath sounds: Normal breath sounds. No wheezing or rales.   Abdominal:      General: Bowel sounds are normal. There is no distension.      Palpations: Abdomen is soft. There is no mass.      Tenderness: There is no abdominal tenderness. There is no guarding or rebound.      Hernia: No hernia is present.   Genitourinary:     General: Normal vulva.      Labia:         Right: No rash or lesion.         Left: No rash or lesion.       Urethra: No urethral pain or urethral swelling.      Vagina: No erythema, tenderness, bleeding or lesions.      Cervix: No cervical motion tenderness, discharge, friability, lesion, erythema, cervical bleeding or eversion.      Uterus: Normal. Not enlarged and not tender.       Adnexa:         Right: No mass, tenderness or fullness.          Left: No mass, tenderness or fullness.        " Comments: Good estrogen effect.  Reminants of vaginal septum 1.5 cm wide, whispy on bilateral vaginal wall, 1 cm above hymen.  Soft, pliable.    Neurological:      Mental Status: She is alert.   Psychiatric:         Mood and Affect: Mood normal.         Behavior: Behavior normal.         Thought Content: Thought content normal.         Judgment: Judgment normal.           Note: This dictation was generated using Dragon voice recognition software. Please excuse any grammatical or spelling errors that may have occurred using the system.

## 2025-03-08 PROBLEM — N95.1 SYMPTOMATIC MENOPAUSAL OR FEMALE CLIMACTERIC STATES: Status: ACTIVE | Noted: 2025-03-08

## 2025-03-31 ENCOUNTER — APPOINTMENT (OUTPATIENT)
Dept: PRIMARY CARE | Facility: CLINIC | Age: 50
End: 2025-03-31
Payer: COMMERCIAL

## 2025-04-14 ASSESSMENT — PROMIS GLOBAL HEALTH SCALE
CARRYOUT_PHYSICAL_ACTIVITIES: A LITTLE
RATE_AVERAGE_PAIN: 6
RATE_PHYSICAL_HEALTH: FAIR
CARRYOUT_SOCIAL_ACTIVITIES: FAIR
RATE_SOCIAL_SATISFACTION: FAIR
RATE_AVERAGE_FATIGUE: MODERATE
EMOTIONAL_PROBLEMS: SOMETIMES
RATE_QUALITY_OF_LIFE: FAIR
RATE_MENTAL_HEALTH: FAIR
RATE_GENERAL_HEALTH: FAIR

## 2025-04-21 ENCOUNTER — APPOINTMENT (OUTPATIENT)
Dept: PRIMARY CARE | Facility: CLINIC | Age: 50
End: 2025-04-21
Payer: COMMERCIAL

## 2025-04-21 VITALS
WEIGHT: 187 LBS | DIASTOLIC BLOOD PRESSURE: 73 MMHG | HEIGHT: 68 IN | SYSTOLIC BLOOD PRESSURE: 112 MMHG | BODY MASS INDEX: 28.34 KG/M2 | OXYGEN SATURATION: 96 % | HEART RATE: 76 BPM

## 2025-04-21 DIAGNOSIS — Z00.00 WELLNESS EXAMINATION: Primary | ICD-10-CM

## 2025-04-21 DIAGNOSIS — I73.00 RAYNAUD'S DISEASE WITHOUT GANGRENE: ICD-10-CM

## 2025-04-21 DIAGNOSIS — R20.0 NUMBNESS IN FEET: ICD-10-CM

## 2025-04-21 DIAGNOSIS — Z12.31 ENCOUNTER FOR SCREENING MAMMOGRAM FOR MALIGNANT NEOPLASM OF BREAST: ICD-10-CM

## 2025-04-21 PROCEDURE — 1036F TOBACCO NON-USER: CPT | Performed by: NURSE PRACTITIONER

## 2025-04-21 PROCEDURE — 3008F BODY MASS INDEX DOCD: CPT | Performed by: NURSE PRACTITIONER

## 2025-04-21 PROCEDURE — 99396 PREV VISIT EST AGE 40-64: CPT | Performed by: NURSE PRACTITIONER

## 2025-04-21 ASSESSMENT — PATIENT HEALTH QUESTIONNAIRE - PHQ9
SUM OF ALL RESPONSES TO PHQ9 QUESTIONS 1 AND 2: 0
2. FEELING DOWN, DEPRESSED OR HOPELESS: NOT AT ALL
1. LITTLE INTEREST OR PLEASURE IN DOING THINGS: NOT AT ALL

## 2025-04-21 NOTE — PROGRESS NOTES
"Subjective   Patient ID: Ange Mcdonald is a 50 y.o. female who presents for Annual Exam (Patient is here today for a physical.. Patient wanted to mention numbness in her left foot..).    50 year old female here for annual CPE  She is having numbness in the outer 4 toes LT foot. Started 2 months ago. No hip or back pain. No injuries or falls  Sees GYN annually- menopausal. States she cannot lose weight.   Was treated by uro for renal rubén with a stent.     Prevention:  Breast Ca screen: 6/2024  Colon Ca Screen: 7/24 colonoscopy after a positive cologuard.  ins will not cover another colonoscopy for 10 years even though GI MD recommends 5 years due to multiple polyps and pos cologuard.   Lung Ca Screen: non smoker. No fam HX Ca  CT Cardiac Score: no fam HX  Diabetes Screen: dad DM2  Opthal: no acute changes. Due for opthal  Dental: overdue  Exercise: none  Diet: caffeine- 1 C coffee a bday, 1 soda pop a day. 2 meals a day. States he was only eating 1 meal and that was not helping her weight. Not eating a well balanced diet.  Sleep: uses a OTC sleep aid to stay asleep. Snores. Has not had a sleep study- declines testing            Review of Systems    Objective   /73   Pulse 76   Ht 1.727 m (5' 8\")   Wt 84.8 kg (187 lb)   SpO2 96%   BMI 28.43 kg/m²     Physical Exam  Vitals reviewed.   Constitutional:       General: She is not in acute distress.     Appearance: Normal appearance. She is obese.   HENT:      Head: Normocephalic and atraumatic.   Eyes:      Extraocular Movements: Extraocular movements intact.      Conjunctiva/sclera: Conjunctivae normal.      Pupils: Pupils are equal, round, and reactive to light.   Neck:      Vascular: No carotid bruit.   Cardiovascular:      Rate and Rhythm: Normal rate and regular rhythm.      Pulses: Normal pulses.      Heart sounds: Normal heart sounds. No murmur heard.  Pulmonary:      Effort: Pulmonary effort is normal.      Breath sounds: Normal breath sounds. "   Abdominal:      General: Bowel sounds are normal. There is no distension.      Palpations: Abdomen is soft.      Tenderness: There is no abdominal tenderness.   Musculoskeletal:         General: Normal range of motion.      Cervical back: Normal range of motion and neck supple.      Right lower leg: Edema (+1 pitting edema over the shins) present.      Left lower leg: Edema (+1 pitting edema ove rhte shins) present.   Lymphadenopathy:      Cervical: No cervical adenopathy.   Skin:     General: Skin is warm and dry.   Neurological:      General: No focal deficit present.      Mental Status: She is alert and oriented to person, place, and time. Mental status is at baseline.      Comments: All fingers dark red   Psychiatric:         Mood and Affect: Mood normal.         Behavior: Behavior normal.         Thought Content: Thought content normal.         Assessment/Plan   Diagnoses and all orders for this visit:  Wellness examination  Comments:  recent labs. we will check the labs at her future visit  Encounter for screening mammogram for malignant neoplasm of breast  -     BI mammo bilateral screening tomosynthesis; Future  Raynaud's disease without gangrene  Numbness in feet  -     XR lumbar spine 2-3 views; Future  -     XR sacrum coccyx 2+ views; Future  Other orders  -     Follow Up In Primary Care - Health Maintenance

## 2025-04-28 ENCOUNTER — APPOINTMENT (OUTPATIENT)
Dept: OBSTETRICS AND GYNECOLOGY | Facility: CLINIC | Age: 50
End: 2025-04-28
Payer: COMMERCIAL

## 2025-05-12 ENCOUNTER — APPOINTMENT (OUTPATIENT)
Dept: RADIOLOGY | Facility: HOSPITAL | Age: 50
End: 2025-05-12
Payer: COMMERCIAL

## 2025-05-19 ENCOUNTER — HOSPITAL ENCOUNTER (OUTPATIENT)
Dept: RADIOLOGY | Facility: HOSPITAL | Age: 50
Discharge: HOME | End: 2025-05-19
Payer: COMMERCIAL

## 2025-05-19 ENCOUNTER — OFFICE VISIT (OUTPATIENT)
Dept: URGENT CARE | Age: 50
End: 2025-05-19
Payer: COMMERCIAL

## 2025-05-19 VITALS
RESPIRATION RATE: 16 BRPM | WEIGHT: 190 LBS | HEART RATE: 78 BPM | TEMPERATURE: 97.5 F | DIASTOLIC BLOOD PRESSURE: 85 MMHG | BODY MASS INDEX: 28.89 KG/M2 | SYSTOLIC BLOOD PRESSURE: 132 MMHG | OXYGEN SATURATION: 96 %

## 2025-05-19 DIAGNOSIS — R20.0 NUMBNESS IN FEET: ICD-10-CM

## 2025-05-19 DIAGNOSIS — N20.0 KIDNEY STONE: ICD-10-CM

## 2025-05-19 DIAGNOSIS — N39.0 URINARY TRACT INFECTION IN FEMALE: Primary | ICD-10-CM

## 2025-05-19 LAB
POC APPEARANCE, URINE: CLEAR
POC BILIRUBIN, URINE: NEGATIVE
POC BLOOD, URINE: ABNORMAL
POC COLOR, URINE: ABNORMAL
POC GLUCOSE, URINE: NEGATIVE MG/DL
POC KETONES, URINE: NEGATIVE MG/DL
POC LEUKOCYTES, URINE: ABNORMAL
POC NITRITE,URINE: NEGATIVE
POC PH, URINE: 6 PH
POC PROTEIN, URINE: ABNORMAL MG/DL
POC SPECIFIC GRAVITY, URINE: <=1.005
POC UROBILINOGEN, URINE: 0.2 EU/DL

## 2025-05-19 PROCEDURE — 72220 X-RAY EXAM SACRUM TAILBONE: CPT

## 2025-05-19 PROCEDURE — 74018 RADEX ABDOMEN 1 VIEW: CPT | Performed by: RADIOLOGY

## 2025-05-19 PROCEDURE — 72110 X-RAY EXAM L-2 SPINE 4/>VWS: CPT

## 2025-05-19 PROCEDURE — 74018 RADEX ABDOMEN 1 VIEW: CPT

## 2025-05-19 PROCEDURE — 72110 X-RAY EXAM L-2 SPINE 4/>VWS: CPT | Performed by: RADIOLOGY

## 2025-05-19 PROCEDURE — 72220 X-RAY EXAM SACRUM TAILBONE: CPT | Performed by: RADIOLOGY

## 2025-05-19 PROCEDURE — 76770 US EXAM ABDO BACK WALL COMP: CPT | Performed by: RADIOLOGY

## 2025-05-19 PROCEDURE — 76770 US EXAM ABDO BACK WALL COMP: CPT

## 2025-05-19 RX ORDER — SULFAMETHOXAZOLE AND TRIMETHOPRIM 800; 160 MG/1; MG/1
1 TABLET ORAL 2 TIMES DAILY
Qty: 14 TABLET | Refills: 0 | Status: SHIPPED | OUTPATIENT
Start: 2025-05-19 | End: 2025-05-26

## 2025-05-19 NOTE — PROGRESS NOTES
Subjective   Patient ID: Ange Mcdonald is a 50 y.o. female. They present today with a chief complaint of Urinary Frequency (For 2 days).    History of Present Illness  See MDM    Past Medical History  Allergies as of 05/19/2025    (No Known Allergies)       Prescriptions Prior to Admission[1]     Medical History[2]    Surgical History[3]     reports that she has never smoked. She has never used smokeless tobacco. She reports current alcohol use. She reports that she does not use drugs.    Review of Systems  ROS is negative unless otherwise stated in HPI.       Objective    Vitals:    05/19/25 1124   BP: 132/85   BP Location: Left arm   Patient Position: Sitting   BP Cuff Size: Adult   Pulse: 78   Resp: 16   Temp: 36.4 °C (97.5 °F)   TempSrc: Temporal   SpO2: 96%   Weight: 86.2 kg (190 lb)     No LMP recorded. Patient is premenopausal.      VS: As documented in the triage note and EMR flowsheet from this visit was reviewed  General: Well appearing. No acute distress.   Eyes:  Extraocular movements grossly intact. No scleral icterus.   Head: Atraumatic. Normocephalic.     Neck: No meningismus. No gross masses. Full movement through range of motion  CV: Regular rhythm. No murmurs, rubs, gallops appreciated.   Resp: Clear to auscultation bilaterally. No respiratory distress.    GI: Nontender. Soft. No masses. No rebound, rigidity or guarding.  No CVA tenderness.   MSK: Symmetric muscle bulk. No gross step offs or deformities.  Skin: Warm, dry. No rashes  Neuro: CN II-VII intact. A&O x3. Speech fluent. Alert. Moving all extremities. Ambulates with normal gait  Psych: Appropriate mood and affect for situation      Point of Care Test & Imaging Results from this visit  Results for orders placed or performed in visit on 05/19/25   POCT UA Automated manually resulted   Result Value Ref Range    POC Color, Urine Light-Yellow Straw, Yellow, Light-Yellow    POC Appearance, Urine Clear Clear    POC Glucose, Urine NEGATIVE  NEGATIVE mg/dl    POC Bilirubin, Urine NEGATIVE NEGATIVE    POC Ketones, Urine NEGATIVE NEGATIVE mg/dl    POC Specific Gravity, Urine <=1.005 1.005 - 1.035    POC Blood, Urine LARGE (3+) (A) NEGATIVE    POC PH, Urine 6.0 No Reference Range Established PH    POC Protein, Urine 30 (1+) (A) NEGATIVE mg/dl    POC Urobilinogen, Urine 0.2 0.2, 1.0 EU/DL    Poc Nitrite, Urine NEGATIVE NEGATIVE    POC Leukocytes, Urine SMALL (1+) (A) NEGATIVE      Imaging  No results found.    Cardiology, Vascular, and Other Imaging  No other imaging results found for the past 2 days      Diagnostic study results (if any) were reviewed by Kathleen Jay PA-C.    Assessment/Plan   Allergies, medications, history, and pertinent labs/EKGs/Imaging reviewed by Kathleen Jay PA-C.     Medical Decision Making  Patient is a 50-year-old female who presents with urinary frequency and dysuria.  On examination, patient well-appearing.  Vitals are stable.  Abdominal examination is benign.  No CVA tenderness.  Urinalysis reveals large blood and 1+ leukocytes.  Prior urine culture results reviewed.  Will start patient on Bactrim twice daily for the next 7 days.  Urine sent for culture. Patient informed of the diagnosis.  They are agreeable to the plan as discussed above.  Patient given the opportunity to ask questions.  All of the patient's questions were answered. Given precautions in which to seek attention in the emergency department. Discussed follow up with PCP or other appropriate clinician.      Orders and Diagnoses  Diagnoses and all orders for this visit:  Urinary tract infection in female  -     POCT UA Automated manually resulted  -     Urine Culture  -     sulfamethoxazole-trimethoprim (Bactrim DS) 800-160 mg tablet; Take 1 tablet by mouth 2 times a day for 7 days.      Medical Admin Record      Patient disposition: Home    Electronically signed by Kathleen Jay PA-C  11:38 AM           [1] (Not in a hospital admission)   [2]   Past  Medical History:  Diagnosis Date    Kidney stone on left side     PONV (postoperative nausea and vomiting)    [3]   Past Surgical History:  Procedure Laterality Date    COLONOSCOPY  07/15/2024    CYSTOSCOPY W/ LASER LITHOTRIPSY  12/27/2024    with left stent placement    HERNIA REPAIR      Inguinal -child    MANDIBLE SURGERY      Reconstruction Jaw

## 2025-05-21 LAB — BACTERIA UR CULT: ABNORMAL

## 2025-05-31 NOTE — PROGRESS NOTES
Subjective   Patient ID: Ange Mcdonald is a 50 y.o. female.      HPI  50 y.o. female presents for a follow up visit. She is status post ureteroscopy with stone extraction and retrograde pyelogram on 01/20/2025. She obtained the recently ordered imaging and presents for results.     The patient reports experiencing lower right tailbone pain.      She reports not drinking a lot of fluids.     XR ABDOMEN 1 VIEW: 5/19/2025   IMPRESSION:  No acute abdominal abnormality.    US RENAL COMPLETE 5/19/2025   IMPRESSION:  Mild left-sided hydronephrosis, of uncertain etiology. If further  evaluation is warranted, CT can be offered.    Review of Systems  A complete review of systems was performed. All systems are noted to be negative unless indicated in the history of present illness, impression, active problem list, or past histories.     Objective   Physical Exam  No CVA tenderness    Assessment/Plan   Diagnoses and all orders for this visit:  History of kidney stones  Hydronephrosis, unspecified hydronephrosis type  -     US renal complete; Future      50 y.o. female presents for a follow up visit. She is status post ureteroscopy with stone extraction and retrograde pyelogram on 01/20/2025. She obtained the recently ordered imaging and presents for results.     I personally reviewed the XR of the abdomen that was completed on 05/19/2025. The imaging results determines no acute abdominal abnormality.      I personally reviewed the renal US that was obtained on 05/19/2025. The patient's imaging details mild left sided hydronephrosis of unknown etiology.  Previously the left kidney had hydronephrosis due to chronic changes from a renal calculi. She continues to have mild hydronephrosis in the left kidney. I want to continue to monitor the hydronephrosis therefore I will order the patient a renal ultrasound for 11/2025.    Plan:  US renal 11/2025  FUV with urology in 11/2025      Scribe Attestation   By signing my name below, I,  Aden Castañeda attestation that this documentation has been prepared under the direction and in the presence of Tanya Martinez MD.

## 2025-06-02 ENCOUNTER — APPOINTMENT (OUTPATIENT)
Dept: UROLOGY | Facility: CLINIC | Age: 50
End: 2025-06-02
Payer: COMMERCIAL

## 2025-06-02 DIAGNOSIS — Z87.442 HISTORY OF KIDNEY STONES: Primary | ICD-10-CM

## 2025-06-02 DIAGNOSIS — N13.30 HYDRONEPHROSIS, UNSPECIFIED HYDRONEPHROSIS TYPE: ICD-10-CM

## 2025-06-02 PROCEDURE — 99214 OFFICE O/P EST MOD 30 MIN: CPT | Performed by: STUDENT IN AN ORGANIZED HEALTH CARE EDUCATION/TRAINING PROGRAM

## 2025-06-30 ENCOUNTER — APPOINTMENT (OUTPATIENT)
Dept: RADIOLOGY | Facility: HOSPITAL | Age: 50
End: 2025-06-30
Payer: COMMERCIAL

## 2025-06-30 DIAGNOSIS — Z12.31 ENCOUNTER FOR SCREENING MAMMOGRAM FOR MALIGNANT NEOPLASM OF BREAST: ICD-10-CM

## 2025-06-30 PROCEDURE — 77067 SCR MAMMO BI INCL CAD: CPT | Performed by: STUDENT IN AN ORGANIZED HEALTH CARE EDUCATION/TRAINING PROGRAM

## 2025-06-30 PROCEDURE — 77063 BREAST TOMOSYNTHESIS BI: CPT

## 2025-06-30 PROCEDURE — 77063 BREAST TOMOSYNTHESIS BI: CPT | Performed by: STUDENT IN AN ORGANIZED HEALTH CARE EDUCATION/TRAINING PROGRAM

## 2025-08-11 ENCOUNTER — APPOINTMENT (OUTPATIENT)
Dept: NEPHROLOGY | Facility: CLINIC | Age: 50
End: 2025-08-11
Payer: COMMERCIAL

## 2025-08-11 VITALS
WEIGHT: 194.8 LBS | DIASTOLIC BLOOD PRESSURE: 77 MMHG | TEMPERATURE: 98.4 F | BODY MASS INDEX: 29.52 KG/M2 | SYSTOLIC BLOOD PRESSURE: 122 MMHG | HEIGHT: 68 IN

## 2025-08-11 DIAGNOSIS — E83.59 CALCIUM OXALATE CALCULUS: ICD-10-CM

## 2025-08-11 DIAGNOSIS — N20.0 RENAL CALCULI: Primary | ICD-10-CM

## 2025-08-11 LAB
POC APPEARANCE, URINE: ABNORMAL
POC BILIRUBIN, URINE: NEGATIVE
POC BLOOD, URINE: ABNORMAL
POC COLOR, URINE: YELLOW
POC GLUCOSE, URINE: NEGATIVE MG/DL
POC KETONES, URINE: ABNORMAL MG/DL
POC LEUKOCYTES, URINE: ABNORMAL
POC NITRITE,URINE: NEGATIVE
POC PH, URINE: 6 PH
POC PROTEIN, URINE: ABNORMAL MG/DL
POC SPECIFIC GRAVITY, URINE: >=1.03
POC UROBILINOGEN, URINE: 0.2 EU/DL

## 2025-08-11 PROCEDURE — 3008F BODY MASS INDEX DOCD: CPT | Performed by: INTERNAL MEDICINE

## 2025-08-11 PROCEDURE — 81003 URINALYSIS AUTO W/O SCOPE: CPT | Performed by: INTERNAL MEDICINE

## 2025-08-11 PROCEDURE — 99204 OFFICE O/P NEW MOD 45 MIN: CPT | Performed by: INTERNAL MEDICINE

## 2025-10-06 ENCOUNTER — APPOINTMENT (OUTPATIENT)
Dept: RADIOLOGY | Facility: HOSPITAL | Age: 50
End: 2025-10-06
Payer: COMMERCIAL

## 2025-11-03 ENCOUNTER — APPOINTMENT (OUTPATIENT)
Dept: UROLOGY | Facility: CLINIC | Age: 50
End: 2025-11-03
Payer: COMMERCIAL

## 2026-03-16 ENCOUNTER — APPOINTMENT (OUTPATIENT)
Dept: NEPHROLOGY | Facility: CLINIC | Age: 51
End: 2026-03-16
Payer: COMMERCIAL

## 2026-04-27 ENCOUNTER — APPOINTMENT (OUTPATIENT)
Dept: PRIMARY CARE | Facility: CLINIC | Age: 51
End: 2026-04-27
Payer: COMMERCIAL

## 2026-05-18 ENCOUNTER — APPOINTMENT (OUTPATIENT)
Dept: PRIMARY CARE | Facility: CLINIC | Age: 51
End: 2026-05-18
Payer: COMMERCIAL

## (undated) DEVICE — CATHETER, URETERAL, OPEN END, 5 FR, 70 CM

## (undated) DEVICE — URETERSCOPE, APTRA SCOPE, STANDARD DEFLECTION, SNGLE USE

## (undated) DEVICE — COLLECTION BAG, FLUID, NON-STERILE

## (undated) DEVICE — LASER FIBER, HOLMIUM 200

## (undated) DEVICE — SHEATH, URETERAL ACCESS, NAVIGATOR 12/14FR X 36CM

## (undated) DEVICE — BASKET, STONE, RETRIEVAL, NITINOL (4WIRE, 1.9 0 TIP)

## (undated) DEVICE — Device

## (undated) DEVICE — IRRIGATION KIT, PUMPING, SINGLE ACTION, SYRINGE, 10 CC

## (undated) DEVICE — GUIDEWIRE, ULTRA TRACK, HYBRID, 0.038 IN STRAIGHT TIP

## (undated) DEVICE — SYRINGE, 10 CC, LUER LOCK

## (undated) DEVICE — TRAY, DRY PREP, PREMIUM

## (undated) DEVICE — PREP, SKIN, BETADINE, SOLUTION, 16 OZ

## (undated) DEVICE — PREP TRAY, SKIN, DRY, W/GLOVES

## (undated) DEVICE — STOPCOCK, SAN ANTONIO, W/MODIFIED FITTING

## (undated) DEVICE — DRAPE, C-ARM IMAGE

## (undated) DEVICE — TUBING, SUCTION, CONNECTING, NON-CONDUCTIVE, SURE GRIP CONNECTORS, 3/16 IN X 10 FT

## (undated) DEVICE — SOLUTION, IRRIGATION, USP, SODIUM CHLORIDE 0.9%, 3000 ML, BAG

## (undated) DEVICE — BAG, PRESSURE INFUSER, 3000 CC, LF

## (undated) DEVICE — IRRIGATION SET, CYSTOSCOPY, TURP, Y, CONTINUOUS, 81 IN

## (undated) DEVICE — SHEATH, RETRACE URETERAL ACCESS, 10-12FR 35CM

## (undated) DEVICE — SYRINGE, LUER LOCK, 12ML

## (undated) DEVICE — GLOVE, SURGICAL, PROTEXIS PI BLUE W/NEUTHERA, 7.5, PF, LF

## (undated) DEVICE — MARKER, SKIN, DUAL TIP INK W/9 LABEL AND REMOVABLE TIME OUT SLEEVE

## (undated) DEVICE — TOWEL PACK, STERILE, 4/PACK, BLUE

## (undated) DEVICE — GUIDEWIRE, STRAIGHT, AMPLATZ SUPER STIFF, 0.035 IN X 180 CM